# Patient Record
Sex: FEMALE | Race: OTHER | NOT HISPANIC OR LATINO
[De-identification: names, ages, dates, MRNs, and addresses within clinical notes are randomized per-mention and may not be internally consistent; named-entity substitution may affect disease eponyms.]

---

## 2020-06-05 PROBLEM — Z00.00 ENCOUNTER FOR PREVENTIVE HEALTH EXAMINATION: Status: ACTIVE | Noted: 2020-06-05

## 2020-06-11 ENCOUNTER — APPOINTMENT (OUTPATIENT)
Dept: NEPHROLOGY | Facility: CLINIC | Age: 25
End: 2020-06-11
Payer: COMMERCIAL

## 2020-06-11 VITALS
SYSTOLIC BLOOD PRESSURE: 96 MMHG | WEIGHT: 120 LBS | HEIGHT: 65 IN | HEART RATE: 72 BPM | DIASTOLIC BLOOD PRESSURE: 65 MMHG | BODY MASS INDEX: 19.99 KG/M2

## 2020-06-11 DIAGNOSIS — G72.9 MYOPATHY, UNSPECIFIED: ICD-10-CM

## 2020-06-11 PROCEDURE — 99205 OFFICE O/P NEW HI 60 MIN: CPT

## 2020-06-11 NOTE — ASSESSMENT
[FreeTextEntry1] : 25-year-old woman with a two-year history of mild proteinuria associated with an impressively positive CAM, but negative anti-DNA and other autoimmune markers, and normal ESR and CRP.  Her renal function is perfectly normal as is her BP.  Her proteinuria resolved on ACE inhibitor, which was recently stopped.  She clearly has some form of autoimmune diathesis, which falls short of a diagnosis of SLE.  What ever this lupus-like syndrome is, it is very possible that on kidney biopsy she would exhibit essentially class II (mesangial proliferative0 or classI (minimal mesangial), but neither would be treated with anything more than what she has already received namely RAASI .  So I am not currently in favor of performing a percutaneous renal biopsy although this may be needed at some point in the future.  I have ordered a renal ultrasound to be formally read by radiology, and labs to include urine microalbumin, urinalysis, CPK, BRIAN 2 to screen for membranous nephropathy.  If she has significant microalbuminuria again now I would favor putting her back on ramipril or an ARB.  It seems clear that her family harbors autoimmune phenomena and that she does as well.  From a prognostic standpoint, it is very reassuring that her BP is absolutely normal, as is her kidney function and that proteinuria was mild to begin with and responded to ACE inhibition.   Interestingly, she really does not complain of arthralgias as much is myalgias -she does not appear to have any full-blown features of polymyositis or dermatomyositis, but studies have shown renal involvement in about one quarter of these patients, occasionally IgA nephropathy or membranous seen on biopsy.  We will see if her CPK is elevated

## 2020-06-11 NOTE — CONSULT LETTER
[Consult Letter:] : I had the pleasure of evaluating your patient, [unfilled]. [Dear  ___] : Dear  [unfilled], [Sincerely,] : Sincerely, [Please see my note below.] : Please see my note below. [Consult Closing:] : Thank you very much for allowing me to participate in the care of this patient.  If you have any questions, please do not hesitate to contact me. [DrSyl  ___] : Dr. DEL RIO [FreeTextEntry2] : Dr Townsend ([place]) [FreeTextEntry3] : Sincerely, \par \par Johnny Roland MD, FACP

## 2020-06-11 NOTE — HISTORY OF PRESENT ILLNESS
[FreeTextEntry1] : 25-year-old woman with a two-year history of proteinuria, in association with a positive CAM and titers of 1-320 to 1:1080, but negative anti-DNA, normal ESR and CRP, negative Sjogren's negative RNP SSA and SSB, and anti ro.  She was treated with ramipril and a dose of 10 mg and then taper down and off, 1 proteinuria resolved she is now off ramipril for several months and her UA shows sometimes negative and sometimes trace protein.  Her creatinine is 0.73 and her .  She had a single ultrasound of the kidneys done in the urologist office and that was said to be okay.  She exhibits a facial rash at times, and is mildly photosensitive, but denies full Raynaud's.  Her family history is remarkable in that her 25-year-old brother has an CAM of 1 2/3/2020 but no other stigmata of full-blown autoimmune disease her mother has a thyroid problem, which may or may not be Hashimoto's thyroiditis.  Her father has RA.  She has seen a rheumatologist, Dr. Prudencio Abernathy in [place].  Her primary is Dr. Townsend in [place].. She is clinically and biochemically euthyroid.  She complains of achy muscles, not joints, and this appears to been confirmed by a orthopedic consult.  I do not have access to any muscle enzyme results.  She has been placed on hydroxychloroquine but I am not sure of dose.

## 2020-06-11 NOTE — PHYSICAL EXAM
[General Appearance - Alert] : alert [General Appearance - In No Acute Distress] : in no acute distress [Sclera] : the sclera and conjunctiva were normal [PERRL With Normal Accommodation] : pupils were equal in size, round, and reactive to light [Neck Cervical Mass (___cm)] : no neck mass was observed [Neck Appearance] : the appearance of the neck was normal [Outer Ear] : the ears and nose were normal in appearance [Jugular Venous Distention Increased] : there was no jugular-venous distention [Heart Sounds] : normal S1 and S2 [Heart Sounds Gallop] : no gallops [Heart Rate And Rhythm] : heart rate was normal and rhythm regular [Auscultation Breath Sounds / Voice Sounds] : lungs were clear to auscultation bilaterally [Heart Sounds Pericardial Friction Rub] : no pericardial rub [Murmurs] : no murmurs [FreeTextEntry1] : fingertips are cool, but not blanched [Edema] : there was no peripheral edema [Cervical Lymph Nodes Enlarged Posterior Bilaterally] : posterior cervical [Axillary Lymph Nodes Enlarged Bilaterally] : axillary [Cervical Lymph Nodes Enlarged Anterior Bilaterally] : anterior cervical [Supraclavicular Lymph Nodes Enlarged Bilaterally] : supraclavicular [No CVA Tenderness] : no ~M costovertebral angle tenderness [Inguinal Lymph Nodes Enlarged Bilaterally] : inguinal [Femoral Lymph Nodes Enlarged Bilaterally] : femoral [Abnormal Walk] : normal gait [No Spinal Tenderness] : no spinal tenderness [Motor Tone] : muscle strength and tone were normal [Musculoskeletal - Swelling] : no joint swelling seen [Nail Clubbing] : no clubbing  or cyanosis of the fingernails [Skin Turgor] : normal skin turgor [] : no rash [Skin Color & Pigmentation] : normal skin color and pigmentation [No Focal Deficits] : no focal deficits [Deep Tendon Reflexes (DTR)] : deep tendon reflexes were 2+ and symmetric [Sensation] : the sensory exam was normal to light touch and pinprick [Oriented To Time, Place, And Person] : oriented to person, place, and time [Impaired Insight] : insight and judgment were intact [Affect] : the affect was normal

## 2020-07-01 ENCOUNTER — APPOINTMENT (OUTPATIENT)
Dept: NEPHROLOGY | Facility: CLINIC | Age: 25
End: 2020-07-01
Payer: COMMERCIAL

## 2020-07-01 VITALS — BODY MASS INDEX: 19.99 KG/M2 | WEIGHT: 120 LBS | HEIGHT: 65 IN

## 2020-07-01 DIAGNOSIS — Z82.61 FAMILY HISTORY OF ARTHRITIS: ICD-10-CM

## 2020-07-01 DIAGNOSIS — R31.29 OTHER MICROSCOPIC HEMATURIA: ICD-10-CM

## 2020-07-01 DIAGNOSIS — R80.9 PROTEINURIA, UNSPECIFIED: ICD-10-CM

## 2020-07-01 DIAGNOSIS — N18.9 CHRONIC KIDNEY DISEASE, UNSPECIFIED: ICD-10-CM

## 2020-07-01 PROCEDURE — 99442: CPT

## 2020-07-01 NOTE — HISTORY OF PRESENT ILLNESS
[Medical Office: ([facility])___] : at the medical office located in  [Verbal consent obtained from patient] : the patient, [unfilled] [Home] : at home, [unfilled] , at the time of the visit. [FreeTextEntry1] : Discussed with patient : You have chosen to receive care through the use of tele-media.  It enables healthcare providers at different locations to provide safe, effective, and convenient care through the use of technology.  Please note this is a billable encounter.  As with any healthcare service, there are risks associated with the use of tele-media, including  issues.  You understand that I cannot physically examine you and that you may need to come to the office to complete the assessment.   Patient agreed verbally and understands the risks and benefits of tele-media as explained.  All questions regarding tele-media encounters were answered.\par                                                                                                                                                                                         25-year-old woman with a two-year history of proteinuria in association with a high titer CAM, but negative anti-DNA, normal sed rate and CRP, negative Sjogren's negative RNP negative SSA and SSB.  She took ramipril for an extended period and proteinuria has essentially resolved.  Her creatinine was 0.73 with a GFR of 134.  A renal ultrasound is normal.  Hepatitis B and C are negative, BRIAN 2 is normal.  Urinalysis 2 weeks ago showed no proteinuria and a urine microalbumin was 31, at worst that is marginal

## 2020-07-01 NOTE — ASSESSMENT
[FreeTextEntry1] : This 25-year-old woman has an ill-defined autoimmune diathesis associated with a 2-year history of proteinuria, which has essentially resolved after period of therapy with ramipril.  Renal function is normal, and kidneys appear normal on ultrasound.  She recently saw a dermatologist who mentioned a fingernail abnormality.  She will shortly see her rheumatologist, Dr. Prudencio Abernathy and follow-up with her PCP, Dr. Townsend.  Time spent 11 minutes

## 2020-07-01 NOTE — CONSULT LETTER
[Dear  ___] : Dear  [unfilled], [Consult Letter:] : I had the pleasure of evaluating your patient, [unfilled]. [Please see my note below.] : Please see my note below. [Consult Closing:] : Thank you very much for allowing me to participate in the care of this patient.  If you have any questions, please do not hesitate to contact me. [Sincerely,] : Sincerely, [DrSyl  ___] : Dr. DEL RIO [FreeTextEntry2] : Dr Townsend [FreeTextEntry3] : .sig

## 2021-08-16 ENCOUNTER — APPOINTMENT (OUTPATIENT)
Dept: MRI IMAGING | Facility: CLINIC | Age: 26
End: 2021-08-16
Payer: COMMERCIAL

## 2021-08-16 ENCOUNTER — OUTPATIENT (OUTPATIENT)
Dept: OUTPATIENT SERVICES | Facility: HOSPITAL | Age: 26
LOS: 1 days | End: 2021-08-16

## 2021-08-16 DIAGNOSIS — Z00.8 ENCOUNTER FOR OTHER GENERAL EXAMINATION: ICD-10-CM

## 2021-08-16 PROCEDURE — 72197 MRI PELVIS W/O & W/DYE: CPT | Mod: 26

## 2021-08-17 ENCOUNTER — TRANSCRIPTION ENCOUNTER (OUTPATIENT)
Age: 26
End: 2021-08-17

## 2021-08-19 ENCOUNTER — APPOINTMENT (OUTPATIENT)
Dept: MRI IMAGING | Facility: CLINIC | Age: 26
End: 2021-08-19

## 2022-03-29 ENCOUNTER — APPOINTMENT (OUTPATIENT)
Dept: UROGYNECOLOGY | Facility: CLINIC | Age: 27
End: 2022-03-29

## 2022-04-25 ENCOUNTER — RESULT CHARGE (OUTPATIENT)
Age: 27
End: 2022-04-25

## 2022-04-25 ENCOUNTER — APPOINTMENT (OUTPATIENT)
Dept: UROGYNECOLOGY | Facility: CLINIC | Age: 27
End: 2022-04-25
Payer: COMMERCIAL

## 2022-04-25 VITALS
SYSTOLIC BLOOD PRESSURE: 128 MMHG | DIASTOLIC BLOOD PRESSURE: 80 MMHG | WEIGHT: 125 LBS | OXYGEN SATURATION: 100 % | BODY MASS INDEX: 20.83 KG/M2 | HEIGHT: 65 IN

## 2022-04-25 DIAGNOSIS — K59.00 CONSTIPATION, UNSPECIFIED: ICD-10-CM

## 2022-04-25 DIAGNOSIS — Z83.438 FAMILY HISTORY OF OTHER DISORDER OF LIPOPROTEIN METABOLISM AND OTHER LIPIDEMIA: ICD-10-CM

## 2022-04-25 DIAGNOSIS — R39.13 SPLITTING OF URINARY STREAM: ICD-10-CM

## 2022-04-25 DIAGNOSIS — Z80.0 FAMILY HISTORY OF MALIGNANT NEOPLASM OF DIGESTIVE ORGANS: ICD-10-CM

## 2022-04-25 DIAGNOSIS — R30.0 DYSURIA: ICD-10-CM

## 2022-04-25 DIAGNOSIS — Z78.9 OTHER SPECIFIED HEALTH STATUS: ICD-10-CM

## 2022-04-25 DIAGNOSIS — Z82.49 FAMILY HISTORY OF ISCHEMIC HEART DISEASE AND OTHER DISEASES OF THE CIRCULATORY SYSTEM: ICD-10-CM

## 2022-04-25 DIAGNOSIS — K25.9 GASTRIC ULCER, UNSPECIFIED AS ACUTE OR CHRONIC, W/OUT HEMORRHAGE OR PERFORATION: ICD-10-CM

## 2022-04-25 LAB
BILIRUB UR QL STRIP: NEGATIVE
CLARITY UR: CLEAR
COLLECTION METHOD: NORMAL
GLUCOSE UR-MCNC: NEGATIVE
HCG UR QL: 1 EU/DL
HGB UR QL STRIP.AUTO: NEGATIVE
KETONES UR-MCNC: NEGATIVE
LEUKOCYTE ESTERASE UR QL STRIP: NEGATIVE
NITRITE UR QL STRIP: NEGATIVE
PH UR STRIP: 8.5
PROT UR STRIP-MCNC: NEGATIVE
SP GR UR STRIP: 1.01

## 2022-04-25 PROCEDURE — 99204 OFFICE O/P NEW MOD 45 MIN: CPT | Mod: 25

## 2022-04-25 PROCEDURE — 51701 INSERT BLADDER CATHETER: CPT

## 2022-04-25 RX ORDER — HYDROXYCHLOROQUINE SULFATE 400 MG/1
TABLET ORAL
Refills: 0 | Status: DISCONTINUED | COMMUNITY
End: 2022-04-25

## 2022-04-25 RX ORDER — DULOXETINE HYDROCHLORIDE 30 MG/1
CAPSULE, DELAYED RELEASE ORAL
Refills: 0 | Status: ACTIVE | COMMUNITY

## 2022-04-25 RX ORDER — OMEPRAZOLE 10 MG/1
10 CAPSULE, DELAYED RELEASE ORAL
Refills: 0 | Status: ACTIVE | COMMUNITY

## 2022-04-25 NOTE — PHYSICAL EXAM
[Chaperone Present] : A chaperone was present in the examining room during all aspects of the physical examination [No Acute Distress] : in no acute distress [Well developed] : well developed [Well Nourished] : ~L well nourished [Oriented x3] : oriented to person, place, and time [No Edema] : ~T edema was not present [Scar] : a scar was noted [Warm and Dry] : was warm and dry to touch [Normal Gait] : gait was normal [Normal Appearance] : general appearance was normal [Normal] : no abnormalities [Post Void Residual ____ml] : post void residual was [unfilled] ml [Cough] : no cough [Tenderness] : ~T no ~M abdominal tenderness observed [Distended] : not distended [Hernia] : no hernia observed [FreeTextEntry4] : spasm in levator ani R>L [de-identified] : no prolapse

## 2022-04-25 NOTE — PROCEDURE
[Straight Catheterization] : insertion of a straight catheter [Urinary Tract Infection] : a urinary tract infection [None] : there were no complications with the catheter insertion [No Complications] : no complications [Tolerated Well] : the patient tolerated the procedure well [Post procedure instructions and information given] : Post procedure instructions and information were given and reviewed with patient.

## 2022-04-25 NOTE — DISCUSSION/SUMMARY
[FreeTextEntry1] : Ms. DURHAM is 26yo with pelvic floor muscle spasm, vaginal burning, sensation of incomplete emptying. We discussed my treatment algorithm for pelvic floor muscle spasm.  She was given Valium but only took it for a week.  I suggested she needed to use it for a longer period of time and in conjunction with good pelvic floor PT to determine if it is effective.  I sent a prescription to a compounding pharmacy for 5 mg #30.  We discussed physical therapy and what is invovled with good pelvic floor PT.  The therapist she went to for 1 visit wanted to do electrical stimulation and I told her I thought that was a bad idea.  I [facility] to [facility].  We discussed seeing Sofy Nanci Valdes to evaluate her for vulvodynia and to determine if there are any topical ointments that could help with some of her symptoms.  I asked her to come back and see me in 2 to 3 months.  I was able to answer all of her questions.

## 2022-04-25 NOTE — LETTER BODY
[Dear  ___] : Dear  [unfilled], [I had the pleasure of evaluating your patient, [unfilled]. Thank you for referring Ms. [unfilled] for consultation for ___] : I had the pleasure of evaluating your patient, [unfilled]. Thank you for referring Ms. [unfilled] for consultation for [unfilled]. [DrSyl  ___] : Dr. DEL RIO

## 2022-04-25 NOTE — HISTORY OF PRESENT ILLNESS
[FreeTextEntry1] : 26 yo female with complaints of vaginal and pelvic burning. Has been ongoing for some time now. Has burning before and after urination as well just sitting. She also feels her stream is slow and she has difficulty emptying her bladder fully. She has pain with intercourse. She feels burning and tightness. She is abstaining because of the discomfort. She was diagnose with pelvic floor dysfunction and went to 1 session with a PT who did biofeedback. She was also given Valium suppositories but that did not help at all with the burning. Took it for 5 days. No leaking of urine. Taking cymbalta and it has been making her constipated lately. She has a very long history of issues pelvic muscle tightness but the burning really started in august.

## 2022-04-25 NOTE — OB HISTORY
[Total Preg ___] : : [unfilled] [Definite ___ (Date)] : the last menstrual period was [unfilled] [Normal Amount/Duration] : was of a normal amount and duration [Regular Cycle Intervals] : periods have been regular [Last Pap Smear ___] : date of last pap smear was on [unfilled] [Abstaining d/t Present Problem] : abstaining due to present problem [Abnormal Pap Smear] : normal pap smear [Taking Estrogens] : is not taking estrogen replacement [Sexually Active] : is not sexually active

## 2022-04-29 ENCOUNTER — TRANSCRIPTION ENCOUNTER (OUTPATIENT)
Age: 27
End: 2022-04-29

## 2022-07-28 ENCOUNTER — APPOINTMENT (OUTPATIENT)
Dept: UROGYNECOLOGY | Facility: CLINIC | Age: 27
End: 2022-07-28

## 2022-09-20 ENCOUNTER — APPOINTMENT (OUTPATIENT)
Dept: UROGYNECOLOGY | Facility: CLINIC | Age: 27
End: 2022-09-20

## 2022-09-20 DIAGNOSIS — N89.8 OTHER SPECIFIED NONINFLAMMATORY DISORDERS OF VAGINA: ICD-10-CM

## 2022-09-20 PROCEDURE — 99215 OFFICE O/P EST HI 40 MIN: CPT

## 2022-09-20 PROCEDURE — 87210 SMEAR WET MOUNT SALINE/INK: CPT | Mod: QW

## 2022-09-20 PROCEDURE — 83986 ASSAY PH BODY FLUID NOS: CPT | Mod: QW

## 2022-09-24 LAB
A VAGINAE DNA VAG QL NAA+PROBE: NORMAL
BVAB2 DNA VAG QL NAA+PROBE: NORMAL
C KRUSEI DNA VAG QL NAA+PROBE: NEGATIVE
MEGA1 DNA VAG QL NAA+PROBE: NORMAL
T VAGINALIS RRNA SPEC QL NAA+PROBE: NEGATIVE

## 2022-10-20 ENCOUNTER — APPOINTMENT (OUTPATIENT)
Dept: UROGYNECOLOGY | Facility: CLINIC | Age: 27
End: 2022-10-20

## 2022-10-20 VITALS
SYSTOLIC BLOOD PRESSURE: 114 MMHG | DIASTOLIC BLOOD PRESSURE: 76 MMHG | HEIGHT: 65 IN | WEIGHT: 125 LBS | BODY MASS INDEX: 20.83 KG/M2

## 2022-10-20 PROCEDURE — 51798 US URINE CAPACITY MEASURE: CPT

## 2022-10-20 PROCEDURE — 99213 OFFICE O/P EST LOW 20 MIN: CPT | Mod: 25

## 2022-10-24 NOTE — HISTORY OF PRESENT ILLNESS
[FreeTextEntry1] : Has been doing PT. Also taking nortriptyline, estradiol/gabapentin cream, baclofen. Feels nothing has really improved. Was thinking about trigger point injection.

## 2022-10-24 NOTE — DISCUSSION/SUMMARY
[FreeTextEntry1] : Charlene has pelvic floor dysfunction, burning, pelvic pain. She is currently on a treatment regiment with RADHIKA Christine. She does not feel much improvement. She is also getting PT. We discussed what would  be involved in trigger point injections with BTX. Will consider this after enough time has elapsed on current regiment.

## 2022-10-24 NOTE — PHYSICAL EXAM
[Chaperone Present] : A chaperone was present in the examining room during all aspects of the physical examination [No Acute Distress] : in no acute distress [Oriented x3] : oriented to person, place, and time [Normal Mood/Affect] : mood and affect are normal [Normal Appearance] : general appearance was normal [Tenderness] : tenderness [Normal] : no abnormalities [Cough] : no cough [FreeTextEntry4] : spasm in levator ani and OI L>R tenderness

## 2022-10-31 ENCOUNTER — TRANSCRIPTION ENCOUNTER (OUTPATIENT)
Age: 27
End: 2022-10-31

## 2022-11-04 ENCOUNTER — NON-APPOINTMENT (OUTPATIENT)
Age: 27
End: 2022-11-04

## 2022-11-07 ENCOUNTER — NON-APPOINTMENT (OUTPATIENT)
Age: 27
End: 2022-11-07

## 2022-11-10 ENCOUNTER — APPOINTMENT (OUTPATIENT)
Dept: UROGYNECOLOGY | Facility: CLINIC | Age: 27
End: 2022-11-10

## 2022-11-10 VITALS
WEIGHT: 125 LBS | DIASTOLIC BLOOD PRESSURE: 78 MMHG | SYSTOLIC BLOOD PRESSURE: 120 MMHG | HEIGHT: 66 IN | BODY MASS INDEX: 20.09 KG/M2

## 2022-11-10 DIAGNOSIS — M79.18 MYALGIA, OTHER SITE: ICD-10-CM

## 2022-11-10 PROCEDURE — 99213 OFFICE O/P EST LOW 20 MIN: CPT

## 2022-11-10 NOTE — PHYSICAL EXAM
[Normal] : normal external genitalia [Normal Appearance] : general appearance was normal [FreeTextEntry4] : spasm in perineal body levator ani and OI on Right>>> left

## 2022-11-10 NOTE — DISCUSSION/SUMMARY
[FreeTextEntry1] : We discussed BTX and pudendal nerve block. We discussed risk of exacerbation of pain, it not helping with the pain. I encouraged her to continue with everything that Sofy EDGAR is doing as that will take months to determine its true effectiveness. Plan to inject 5 mls into perineal body, bulbocavernosus and 3mls in right OI and Levator anad 2mls into left levator.

## 2022-11-17 DIAGNOSIS — Z01.818 ENCOUNTER FOR OTHER PREPROCEDURAL EXAMINATION: ICD-10-CM

## 2022-11-18 ENCOUNTER — OUTPATIENT (OUTPATIENT)
Dept: OUTPATIENT SERVICES | Facility: HOSPITAL | Age: 27
LOS: 1 days | End: 2022-11-18
Payer: COMMERCIAL

## 2022-11-18 VITALS
RESPIRATION RATE: 16 BRPM | OXYGEN SATURATION: 99 % | WEIGHT: 125.44 LBS | HEIGHT: 66 IN | SYSTOLIC BLOOD PRESSURE: 92 MMHG | HEART RATE: 83 BPM | TEMPERATURE: 98 F | DIASTOLIC BLOOD PRESSURE: 60 MMHG

## 2022-11-18 DIAGNOSIS — Z87.42 PERSONAL HISTORY OF OTHER DISEASES OF THE FEMALE GENITAL TRACT: Chronic | ICD-10-CM

## 2022-11-18 DIAGNOSIS — Z01.818 ENCOUNTER FOR OTHER PREPROCEDURAL EXAMINATION: ICD-10-CM

## 2022-11-18 DIAGNOSIS — Z13.89 ENCOUNTER FOR SCREENING FOR OTHER DISORDER: ICD-10-CM

## 2022-11-18 DIAGNOSIS — R10.2 PELVIC AND PERINEAL PAIN: ICD-10-CM

## 2022-11-18 DIAGNOSIS — Z29.9 ENCOUNTER FOR PROPHYLACTIC MEASURES, UNSPECIFIED: ICD-10-CM

## 2022-11-18 LAB
ANION GAP SERPL CALC-SCNC: 15 MMOL/L — SIGNIFICANT CHANGE UP (ref 5–17)
APTT BLD: 30.2 SEC — SIGNIFICANT CHANGE UP (ref 27.5–35.5)
BASOPHILS # BLD AUTO: 0.1 K/UL — SIGNIFICANT CHANGE UP (ref 0–0.2)
BASOPHILS NFR BLD AUTO: 1.2 % — SIGNIFICANT CHANGE UP (ref 0–2)
BLD GP AB SCN SERPL QL: SIGNIFICANT CHANGE UP
BUN SERPL-MCNC: 13.8 MG/DL — SIGNIFICANT CHANGE UP (ref 8–20)
CALCIUM SERPL-MCNC: 9.7 MG/DL — SIGNIFICANT CHANGE UP (ref 8.4–10.5)
CHLORIDE SERPL-SCNC: 104 MMOL/L — SIGNIFICANT CHANGE UP (ref 96–108)
CO2 SERPL-SCNC: 22 MMOL/L — SIGNIFICANT CHANGE UP (ref 22–29)
CREAT SERPL-MCNC: 0.6 MG/DL — SIGNIFICANT CHANGE UP (ref 0.5–1.3)
EGFR: 128 ML/MIN/1.73M2 — SIGNIFICANT CHANGE UP
EOSINOPHIL # BLD AUTO: 0.11 K/UL — SIGNIFICANT CHANGE UP (ref 0–0.5)
EOSINOPHIL NFR BLD AUTO: 1.3 % — SIGNIFICANT CHANGE UP (ref 0–6)
GLUCOSE SERPL-MCNC: 94 MG/DL — SIGNIFICANT CHANGE UP (ref 70–99)
HCG SERPL-ACNC: <4 MIU/ML — SIGNIFICANT CHANGE UP
HCT VFR BLD CALC: 43.1 % — SIGNIFICANT CHANGE UP (ref 34.5–45)
HGB BLD-MCNC: 13.9 G/DL — SIGNIFICANT CHANGE UP (ref 11.5–15.5)
IMM GRANULOCYTES NFR BLD AUTO: 0.2 % — SIGNIFICANT CHANGE UP (ref 0–0.9)
INR BLD: 1.07 RATIO — SIGNIFICANT CHANGE UP (ref 0.88–1.16)
LYMPHOCYTES # BLD AUTO: 2.56 K/UL — SIGNIFICANT CHANGE UP (ref 1–3.3)
LYMPHOCYTES # BLD AUTO: 31.3 % — SIGNIFICANT CHANGE UP (ref 13–44)
MCHC RBC-ENTMCNC: 25.5 PG — LOW (ref 27–34)
MCHC RBC-ENTMCNC: 32.3 GM/DL — SIGNIFICANT CHANGE UP (ref 32–36)
MCV RBC AUTO: 79.1 FL — LOW (ref 80–100)
MONOCYTES # BLD AUTO: 0.52 K/UL — SIGNIFICANT CHANGE UP (ref 0–0.9)
MONOCYTES NFR BLD AUTO: 6.4 % — SIGNIFICANT CHANGE UP (ref 2–14)
NEUTROPHILS # BLD AUTO: 4.86 K/UL — SIGNIFICANT CHANGE UP (ref 1.8–7.4)
NEUTROPHILS NFR BLD AUTO: 59.6 % — SIGNIFICANT CHANGE UP (ref 43–77)
PLATELET # BLD AUTO: 265 K/UL — SIGNIFICANT CHANGE UP (ref 150–400)
POTASSIUM SERPL-MCNC: 4 MMOL/L — SIGNIFICANT CHANGE UP (ref 3.5–5.3)
POTASSIUM SERPL-SCNC: 4 MMOL/L — SIGNIFICANT CHANGE UP (ref 3.5–5.3)
PROTHROM AB SERPL-ACNC: 12.4 SEC — SIGNIFICANT CHANGE UP (ref 10.5–13.4)
RBC # BLD: 5.45 M/UL — HIGH (ref 3.8–5.2)
RBC # FLD: 18.6 % — HIGH (ref 10.3–14.5)
SODIUM SERPL-SCNC: 141 MMOL/L — SIGNIFICANT CHANGE UP (ref 135–145)
WBC # BLD: 8.17 K/UL — SIGNIFICANT CHANGE UP (ref 3.8–10.5)
WBC # FLD AUTO: 8.17 K/UL — SIGNIFICANT CHANGE UP (ref 3.8–10.5)

## 2022-11-18 PROCEDURE — 93005 ELECTROCARDIOGRAM TRACING: CPT

## 2022-11-18 PROCEDURE — 93010 ELECTROCARDIOGRAM REPORT: CPT

## 2022-11-18 PROCEDURE — G0463: CPT

## 2022-11-18 NOTE — H&P PST ADULT - PROBLEM SELECTOR PLAN 2
Caprini -2 mild risk   Surgical team please assess/recommend mechanical/pharmacological measures for VTE prophylaxis

## 2022-11-18 NOTE — H&P PST ADULT - PROBLEM SELECTOR PLAN 1
28 y/o female  with PMH of anemia, arrhythmia now with pelvic/perineal pain scheduled for pudendal nerve block and trigger point injection with botox 2022.    -Patient educated on ERP protocol (written/verbal)- verbalized understanding  -Educated on NSAIDS, multivitamins and herbals that increase the risk of bleeding and need to be stopped 5 days before procedure  -Educated on infection prevention  -Covid testing 3-5 days prior to surgery   -Tylenol can be taken 5 days before surgery if needed for pain  -Verbalized understanding of all instructions

## 2022-11-18 NOTE — H&P PST ADULT - HISTORY OF PRESENT ILLNESS
with PMH of HLD, hashimoto presents to PST today. Reports hx of pelvic and perineal pain for about 6 years.  Pt. reports her initial symptoms was inability to use the bathroom. F/u GYN.  Rectocele and colporrhaphy completed 2016. Pt. reports she started having pelvic/perineal pain since the surgery. Reports pain her buttocks and in her groin as well. She is developing hip flexor pain L>R. Completed multiple nerve blocks with the last month 10/2022 with short term relief. Attends pelvic floor PT once a week with no relief yet. Reports pulsating feeling in mid abdomen. Pt. uses miralax every night and has had bowel movements but states that she never feels that she fully emptied her bowel. Denies any bowel incontinence. Scheduled for bilateral pudendal nerve block and trigger point injection with botox 11/30/2022.   26 y/o female  with PMH of anemia present to PST today. Reports hx of pelvic and perineal pain for about 10 years. Reports burning in the vestibule when she urinates and dyspareunia. Started pelvic PT for 3 months without any relief. Reports constipation occasionally. Denies any UTI symptoms. Last pap - Normal.  Scheduled for pudendal nerve block and trigger point injection with botox 2022.   26 y/o female  with PMH of anemia, arrhythmia present to PST today. Reports hx of pelvic and perineal pain for about 10 years. Reports vestibulodynia when she urinates and dyspareunia as well. Started pelvic PT for 3 months without any relief. Reports constipation occasionally. Denies any UTI symptoms. Last pap - Normal.  Scheduled for pudendal nerve block and trigger point injection with botox 2022.

## 2022-11-18 NOTE — H&P PST ADULT - NSICDXPASTMEDICALHX_GEN_ALL_CORE_FT
PAST MEDICAL HISTORY:  Skipped heart beats      PAST MEDICAL HISTORY:  Anemia     Skipped heart beats

## 2022-11-18 NOTE — H&P PST ADULT - ASSESSMENT
28 y/o female  with PMH of anemia, arrhythmia now with pelvic/perineal pain scheduled for pudendal nerve block and trigger point injection with botox 2022.    -Patient educated on ERP protocol (written/verbal)- verbalized understanding  -Educated on NSAIDS, multivitamins and herbals that increase the risk of bleeding and need to be stopped 5 days before procedure  -Educated on infection prevention  -Covid testing 3-5 days prior to surgery   -Tylenol can be taken 5 days before surgery if needed for pain  -Verbalized understanding of all instructions    OPIOID RISK TOOL    JOSELNY EACH BOX THAT APPLIES AND ADD TOTALS AT THE END    FAMILY HISTORY OF SUBSTANCE ABUSE                 FEMALE         MALE                                                Alcohol                             [  ]1 pt          [  ]3pts                                               Illegal Durgs                     [  ]2 pts        [  ]3pts                                               Rx Drugs                           [  ]4 pts        [  ]4 pts    PERSONAL HISTORY OF SUBSTANCE ABUSE                                                                                          Alcohol                             [  ]3 pts       [  ]3 pts                                               Illegal Drugs                     [  ]4 pts        [  ]4 pts                                               Rx Drugs                           [  ]5 pts        [  ]5 pts    AGE BETWEEN 16-45 YEARS                                      [ x ]1 pt         [  ]1 pt    HISTORY OF PREADOLESCENT   SEXUAL ABUSE                                                             [  ]3 pts        [  ]0pts    PSYCHOLOGICAL DISEASE                     ADD, OCD, Bipolar, Schizophrenia        [  ]2 pts         [  ]2 pts                      Depression                                               [  ]1 pt           [  ]1 pt           SCORING TOTAL   (add numbers and type here)              (1)                                     A score of 3 or lower indicated LOW risk for future opioid abuse  A score of 4 to 7 indicated moderate risk for future opioid abuse  A score of 8 or higher indicates a high risk for opioid abuse    CAPRINI SCORE [CLOT]    AGE RELATED RISK FACTORS                                                       MOBILITY RELATED FACTORS  [ ] Age 41-60 years                                            (1 Point)                  [ ] Bed rest                                                        (1 Point)  [ ] Age: 61-74 years                                           (2 Points)                 [ ] Plaster cast                                                   (2 Points)  [ ] Age= 75 years                                              (3 Points)                 [ ] Bed bound for more than 72 hours                 (2 Points)    DISEASE RELATED RISK FACTORS                                               GENDER SPECIFIC FACTORS  [ ] Edema in the lower extremities                       (1 Point)                  [ ] Pregnancy                                                     (1 Point)  [ ] Varicose veins                                               (1 Point)                  [ ] Post-partum < 6 weeks                                   (1 Point)             [ ] BMI > 25 Kg/m2                                            (1 Point)                  [ ] Hormonal therapy  or oral contraception          (1 Point)                 [ ] Sepsis (in the previous month)                        (1 Point)                  [ ] History of pregnancy complications                 (1 point)  [ ] Pneumonia or serious lung disease                                               [ ] Unexplained or recurrent                     (1 Point)           (in the previous month)                               (1 Point)  [ ] Abnormal pulmonary function test                     (1 Point)                 SURGERY RELATED RISK FACTORS  [ ] Acute myocardial infarction                              (1 Point)                 [ ]  Section                                             (1 Point)  [ ] Congestive heart failure (in the previous month)  (1 Point)               [ ] Minor surgery                                                  (1 Point)   [ ] Inflammatory bowel disease                             (1 Point)                 [ ] Arthroscopic surgery                                        (2 Points)  [ ] Central venous access                                      (2 Points)                [x ] General surgery lasting more than 45 minutes   (2 Points)       [ ] Stroke (in the previous month)                          (5 Points)               [ ] Elective arthroplasty                                         (5 Points)                                                                                                                                               HEMATOLOGY RELATED FACTORS                                                 TRAUMA RELATED RISK FACTORS  [ ] Prior episodes of VTE                                     (3 Points)                [ ] Fracture of the hip, pelvis, or leg                       (5 Points)  [ ] Positive family history for VTE                         (3 Points)                 [ ] Acute spinal cord injury (in the previous month)  (5 Points)  [ ] Prothrombin 68736 A                                     (3 Points)                 [ ] Paralysis  (less than 1 month)                             (5 Points)  [ ] Factor V Leiden                                             (3 Points)                  [ ] Multiple Trauma within 1 month                        (5 Points)  [ ] Lupus anticoagulants                                     (3 Points)                                                           [ ] Anticardiolipin antibodies                               (3 Points)                                                       [ ] High homocysteine in the blood                      (3 Points)                                             [ ] Other congenital or acquired thrombophilia      (3 Points)                                                [ ] Heparin induced thrombocytopenia                  (3 Points)                                          Total Score [     2     ]    Caprini Score 0 - 2:  Low Risk, No VTE Prophylaxis required for most patients, encourage ambulation  Caprini Score 3 - 6:  At Risk, pharmacologic VTE prophylaxis is indicated for most patients (in the absence of a contraindication)  Caprini Score Greater than or = 7:  High Risk, pharmacologic VTE prophylaxis is indicated for most patients (in the absence of a contraindication)

## 2022-11-24 LAB
APPEARANCE: CLEAR
BILIRUBIN URINE: NEGATIVE
BLOOD URINE: NEGATIVE
COLOR: NORMAL
GLUCOSE QUALITATIVE U: NEGATIVE
KETONES URINE: ABNORMAL
LEUKOCYTE ESTERASE URINE: NEGATIVE
NITRITE URINE: NEGATIVE
PH URINE: 6.5
PROTEIN URINE: NEGATIVE
SPECIFIC GRAVITY URINE: 1.02
UROBILINOGEN URINE: NORMAL

## 2022-11-25 LAB — BACTERIA UR CULT: NORMAL

## 2022-11-29 ENCOUNTER — TRANSCRIPTION ENCOUNTER (OUTPATIENT)
Age: 27
End: 2022-11-29

## 2022-11-29 LAB — SARS-COV-2 N GENE NPH QL NAA+PROBE: NOT DETECTED

## 2022-11-29 NOTE — ASU PATIENT PROFILE, ADULT - WAS YOUR LAST COVID-19 VACCINE GREATER THAN OR EQUAL TO TWO MONTHS AGO?
11/08/19                            Wil Jarrett  [address] [facility] [address]    To Whom It May Concern:    This is to certify Wil Jarrett was evaluated with [facility] [address] on 11/08/19 and can return to regular work on 11/11/19 if no fever.                [facility] [place] [address] [place] [address] [address] [facility] [address]  Phone: [phone]  Fax: [phone]                    
Yes

## 2022-11-30 ENCOUNTER — TRANSCRIPTION ENCOUNTER (OUTPATIENT)
Age: 27
End: 2022-11-30

## 2022-11-30 ENCOUNTER — OUTPATIENT (OUTPATIENT)
Dept: OUTPATIENT SERVICES | Facility: HOSPITAL | Age: 27
LOS: 1 days | End: 2022-11-30
Payer: COMMERCIAL

## 2022-11-30 ENCOUNTER — APPOINTMENT (OUTPATIENT)
Dept: UROGYNECOLOGY | Facility: HOSPITAL | Age: 27
End: 2022-11-30

## 2022-11-30 VITALS
RESPIRATION RATE: 18 BRPM | HEART RATE: 77 BPM | OXYGEN SATURATION: 100 % | TEMPERATURE: 98 F | SYSTOLIC BLOOD PRESSURE: 123 MMHG | DIASTOLIC BLOOD PRESSURE: 79 MMHG

## 2022-11-30 VITALS
DIASTOLIC BLOOD PRESSURE: 81 MMHG | HEIGHT: 66 IN | OXYGEN SATURATION: 100 % | SYSTOLIC BLOOD PRESSURE: 141 MMHG | RESPIRATION RATE: 15 BRPM | WEIGHT: 125.44 LBS | TEMPERATURE: 98 F | HEART RATE: 98 BPM

## 2022-11-30 DIAGNOSIS — M79.10 MYALGIA, UNSPECIFIED SITE: ICD-10-CM

## 2022-11-30 DIAGNOSIS — R10.2 PELVIC AND PERINEAL PAIN: ICD-10-CM

## 2022-11-30 DIAGNOSIS — Z87.42 PERSONAL HISTORY OF OTHER DISEASES OF THE FEMALE GENITAL TRACT: Chronic | ICD-10-CM

## 2022-11-30 PROCEDURE — 20553 NJX 1/MLT TRIGGER POINTS 3/>: CPT

## 2022-11-30 PROCEDURE — 20553 NJX 1/MLT TRIGGER POINTS 3/>: CPT | Mod: XU

## 2022-11-30 PROCEDURE — 64430 NJX AA&/STRD PUDENDAL NERVE: CPT | Mod: 50,XU

## 2022-11-30 PROCEDURE — 96372 THER/PROPH/DIAG INJ SC/IM: CPT | Mod: XS

## 2022-11-30 RX ORDER — CELECOXIB 200 MG/1
400 CAPSULE ORAL ONCE
Refills: 0 | Status: COMPLETED | OUTPATIENT
Start: 2022-11-30 | End: 2022-11-30

## 2022-11-30 RX ORDER — CEFAZOLIN SODIUM 1 G
2000 VIAL (EA) INJECTION ONCE
Refills: 0 | Status: COMPLETED | OUTPATIENT
Start: 2022-11-30 | End: 2022-11-30

## 2022-11-30 RX ORDER — ONDANSETRON 8 MG/1
4 TABLET, FILM COATED ORAL ONCE
Refills: 0 | Status: DISCONTINUED | OUTPATIENT
Start: 2022-11-30 | End: 2022-11-30

## 2022-11-30 RX ORDER — FENTANYL CITRATE 50 UG/ML
25 INJECTION INTRAVENOUS
Refills: 0 | Status: DISCONTINUED | OUTPATIENT
Start: 2022-11-30 | End: 2022-11-30

## 2022-11-30 RX ORDER — IBUPROFEN 200 MG
1 TABLET ORAL
Qty: 28 | Refills: 0
Start: 2022-11-30 | End: 2022-12-06

## 2022-11-30 RX ORDER — ACETAMINOPHEN 500 MG
975 TABLET ORAL ONCE
Refills: 0 | Status: COMPLETED | OUTPATIENT
Start: 2022-11-30 | End: 2022-11-30

## 2022-11-30 RX ORDER — SODIUM CHLORIDE 9 MG/ML
3 INJECTION INTRAMUSCULAR; INTRAVENOUS; SUBCUTANEOUS ONCE
Refills: 0 | Status: DISCONTINUED | OUTPATIENT
Start: 2022-11-30 | End: 2022-11-30

## 2022-11-30 NOTE — BRIEF OPERATIVE NOTE - NSICDXBRIEFPROCEDURE_GEN_ALL_CORE_FT
PROCEDURES:  Trigger point injection 30-Nov-2022 13:19:59  Marlena Das  Injection of Botox into face 30-Nov-2022 13:20:51  Marlena Das  Pudendal nerve block 30-Nov-2022 13:22:23  Marlena Das

## 2022-11-30 NOTE — ASU DISCHARGE PLAN (ADULT/PEDIATRIC) - CARE PROVIDER_API CALL
Josh Rea)  Obstetrics and Gynecology; Urogynecology  [address] [place] [address], [facility] [address]  [place] [address]  Phone: [phone]  Fax: [phone]  Follow Up Time: 2 weeks

## 2022-11-30 NOTE — ASU DISCHARGE PLAN (ADULT/PEDIATRIC) - NS MD DC FALL RISK RISK
For information on Fall & Injury Prevention, visit: [email or web address] OR  [email or web address] OR  https://www.cdc.gov/steadi/patient.html

## 2022-12-06 ENCOUNTER — APPOINTMENT (OUTPATIENT)
Dept: UROLOGY | Facility: CLINIC | Age: 27
End: 2022-12-06

## 2022-12-07 ENCOUNTER — APPOINTMENT (OUTPATIENT)
Dept: OBGYN | Facility: CLINIC | Age: 27
End: 2022-12-07

## 2022-12-13 ENCOUNTER — NON-APPOINTMENT (OUTPATIENT)
Age: 27
End: 2022-12-13

## 2022-12-13 ENCOUNTER — APPOINTMENT (OUTPATIENT)
Dept: UROGYNECOLOGY | Facility: CLINIC | Age: 27
End: 2022-12-13

## 2022-12-22 ENCOUNTER — APPOINTMENT (OUTPATIENT)
Dept: UROGYNECOLOGY | Facility: CLINIC | Age: 27
End: 2022-12-22

## 2023-05-30 ENCOUNTER — OUTPATIENT (OUTPATIENT)
Dept: OUTPATIENT SERVICES | Facility: HOSPITAL | Age: 28
LOS: 1 days | End: 2023-05-30
Payer: COMMERCIAL

## 2023-05-30 VITALS
HEART RATE: 72 BPM | HEIGHT: 66 IN | SYSTOLIC BLOOD PRESSURE: 90 MMHG | RESPIRATION RATE: 16 BRPM | TEMPERATURE: 97 F | DIASTOLIC BLOOD PRESSURE: 60 MMHG | OXYGEN SATURATION: 99 % | WEIGHT: 131.62 LBS

## 2023-05-30 DIAGNOSIS — R10.2 PELVIC AND PERINEAL PAIN: ICD-10-CM

## 2023-05-30 DIAGNOSIS — Z87.42 PERSONAL HISTORY OF OTHER DISEASES OF THE FEMALE GENITAL TRACT: Chronic | ICD-10-CM

## 2023-05-30 DIAGNOSIS — Z01.818 ENCOUNTER FOR OTHER PREPROCEDURAL EXAMINATION: ICD-10-CM

## 2023-05-30 DIAGNOSIS — Z29.9 ENCOUNTER FOR PROPHYLACTIC MEASURES, UNSPECIFIED: ICD-10-CM

## 2023-05-30 LAB
A1C WITH ESTIMATED AVERAGE GLUCOSE RESULT: 5 % — SIGNIFICANT CHANGE UP (ref 4–5.6)
ANION GAP SERPL CALC-SCNC: 10 MMOL/L — SIGNIFICANT CHANGE UP (ref 5–17)
APPEARANCE UR: ABNORMAL
BACTERIA # UR AUTO: ABNORMAL
BILIRUB UR-MCNC: NEGATIVE — SIGNIFICANT CHANGE UP
BUN SERPL-MCNC: 12.5 MG/DL — SIGNIFICANT CHANGE UP (ref 8–20)
CALCIUM SERPL-MCNC: 9.7 MG/DL — SIGNIFICANT CHANGE UP (ref 8.4–10.5)
CHLORIDE SERPL-SCNC: 106 MMOL/L — SIGNIFICANT CHANGE UP (ref 96–108)
CO2 SERPL-SCNC: 25 MMOL/L — SIGNIFICANT CHANGE UP (ref 22–29)
COLOR SPEC: YELLOW — SIGNIFICANT CHANGE UP
CREAT SERPL-MCNC: 0.63 MG/DL — SIGNIFICANT CHANGE UP (ref 0.5–1.3)
DIFF PNL FLD: ABNORMAL
EGFR: 125 ML/MIN/1.73M2 — SIGNIFICANT CHANGE UP
EPI CELLS # UR: SIGNIFICANT CHANGE UP
ESTIMATED AVERAGE GLUCOSE: 97 MG/DL — SIGNIFICANT CHANGE UP (ref 68–114)
GLUCOSE SERPL-MCNC: 119 MG/DL — HIGH (ref 70–99)
GLUCOSE UR QL: NEGATIVE MG/DL — SIGNIFICANT CHANGE UP
HCT VFR BLD CALC: 41.5 % — SIGNIFICANT CHANGE UP (ref 34.5–45)
HGB BLD-MCNC: 13.1 G/DL — SIGNIFICANT CHANGE UP (ref 11.5–15.5)
KETONES UR-MCNC: NEGATIVE — SIGNIFICANT CHANGE UP
LEUKOCYTE ESTERASE UR-ACNC: NEGATIVE — SIGNIFICANT CHANGE UP
MCHC RBC-ENTMCNC: 27.8 PG — SIGNIFICANT CHANGE UP (ref 27–34)
MCHC RBC-ENTMCNC: 31.6 GM/DL — LOW (ref 32–36)
MCV RBC AUTO: 88.1 FL — SIGNIFICANT CHANGE UP (ref 80–100)
NITRITE UR-MCNC: NEGATIVE — SIGNIFICANT CHANGE UP
PH UR: 6 — SIGNIFICANT CHANGE UP (ref 5–8)
PLATELET # BLD AUTO: 209 K/UL — SIGNIFICANT CHANGE UP (ref 150–400)
POTASSIUM SERPL-MCNC: 4.4 MMOL/L — SIGNIFICANT CHANGE UP (ref 3.5–5.3)
POTASSIUM SERPL-SCNC: 4.4 MMOL/L — SIGNIFICANT CHANGE UP (ref 3.5–5.3)
PROT UR-MCNC: 15
RBC # BLD: 4.71 M/UL — SIGNIFICANT CHANGE UP (ref 3.8–5.2)
RBC # FLD: 13.6 % — SIGNIFICANT CHANGE UP (ref 10.3–14.5)
RBC CASTS # UR COMP ASSIST: ABNORMAL /HPF (ref 0–4)
SODIUM SERPL-SCNC: 141 MMOL/L — SIGNIFICANT CHANGE UP (ref 135–145)
SP GR SPEC: 1.02 — SIGNIFICANT CHANGE UP (ref 1.01–1.02)
UROBILINOGEN FLD QL: NEGATIVE MG/DL — SIGNIFICANT CHANGE UP
WBC # BLD: 5.99 K/UL — SIGNIFICANT CHANGE UP (ref 3.8–10.5)
WBC # FLD AUTO: 5.99 K/UL — SIGNIFICANT CHANGE UP (ref 3.8–10.5)
WBC UR QL: SIGNIFICANT CHANGE UP /HPF (ref 0–5)

## 2023-05-30 PROCEDURE — G0463: CPT

## 2023-05-30 RX ORDER — NORTRIPTYLINE HYDROCHLORIDE 10 MG/1
30 CAPSULE ORAL
Qty: 0 | Refills: 0 | DISCHARGE

## 2023-05-30 RX ORDER — IRON POLYSACCHARIDE COMPLEX 150 MG
0 CAPSULE ORAL
Qty: 0 | Refills: 0 | DISCHARGE

## 2023-05-30 NOTE — H&P PST ADULT - ASSESSMENT
29 y/o female  with no PMH present to PST today, Reports hx of pelvic and perineal pain/tightness for more 10 years. 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 She had pelvic PT for few months without any relief. Denies any UTI symptoms. Last pap - Normal. she had a pudendal nerve block and trigger point injection with Botox 2022. Now she is scheduled for a Trigger point Injection with 300 units of Botox, Pudendal nerve Block by Dr. Rea on 23.    medications reviewed, instructions given on what medications to take and what not to take. No meds to be taken in the AM of surgery. Stop MVI, Co Q 10, Omega 3 DHEA 3-5 days prior to surgery. All other meds can be continued till the night before surgery. ERP teaching given and the pt verbalized understanding. pt is not taking ASA/Plavix/Anticoagulation medication at this time. Asked the pt not to take any NSAID's 5-7 days before surgery and told the pt Tylenol is okay to take for pain, pt verbalized understanding.   CAPRINI VTE 2.0 SCORE [CLOT updated 2019]    AGE RELATED RISK FACTORS                                                       MOBILITY RELATED FACTORS  [ ] Age 41-60 years                                            (1 Point)                    [ ] Bed rest                                                        (1 Point)  [ ] Age: 61-74 years                                           (2 Points)                  [ ] Plaster cast                                                   (2 Points)  [ ] Age= 75 years                                              (3 Points)                    [ ] Bed bound for more than 72 hours                 (2 Points)    DISEASE RELATED RISK FACTORS                                               GENDER SPECIFIC FACTORS  [ ] Edema in the lower extremities                       (1 Point)              [ ] Pregnancy                                                     (1 Point)  [ ] Varicose veins                                               (1 Point)                     [ ] Post-partum < 6 weeks                                   (1 Point)             [ ] BMI > 25 Kg/m2                                            (1 Point)                     [ ] Hormonal therapy  or oral contraception          (1 Point)                 [ ] Sepsis (in the previous month)                        (1 Point)               [ ] History of pregnancy complications                 (1 point)  [ ] Pneumonia or serious lung disease                                               [ ] Unexplained or recurrent                     (1 Point)           (in the previous month)                               (1 Point)  [ ] Abnormal pulmonary function test                     (1 Point)                 SURGERY RELATED RISK FACTORS  [ ] Acute myocardial infarction                              (1 Point)               [ ]  Section                                             (1 Point)  [ ] Congestive heart failure (in the previous month)  (1 Point)      [x ] Minor surgery                                                  (1 Point)   [ ] Inflammatory bowel disease                             (1 Point)               [ ] Arthroscopic surgery                                        (2 Points)  [ ] Central venous access                                      (2 Points)                [ ] General surgery lasting more than 45 minutes (2 points)  [ ] Malignancy- Present or previous                   (2 Points)                [ ] Elective arthroplasty                                         (5 points)    [ ] Stroke (in the previous month)                          (5 Points)                                                                                                                                                           HEMATOLOGY RELATED FACTORS                                                 TRAUMA RELATED RISK FACTORS  [ ] Prior episodes of VTE                                     (3 Points)                [ ] Fracture of the hip, pelvis, or leg                       (5 Points)  [ ] Positive family history for VTE                         (3 Points)             [ ] Acute spinal cord injury (in the previous month)  (5 Points)  [ ] Prothrombin 61552 A                                     (3 Points)               [ ] Paralysis  (less than 1 month)                             (5 Points)  [ ] Factor V Leiden                                             (3 Points)                  [ ] Multiple Trauma within 1 month                        (5 Points)  [ ] Lupus anticoagulants                                     (3 Points)                                                           [ ] Anticardiolipin antibodies                               (3 Points)                                                       [ ] High homocysteine in the blood                      (3 Points)                                             [ ] Other congenital or acquired thrombophilia      (3 Points)                                                [ ] Heparin induced thrombocytopenia                  (3 Points)                                     Total Score [   1       ]  OPIOID RISK TOOL    JOSELYN EACH BOX THAT APPLIES AND ADD TOTALS AT THE END    FAMILY HISTORY OF SUBSTANCE ABUSE                 FEMALE         MALE                                                Alcohol                             [  ]1 pt          [  ]3pts                                               Illegal Drugs                     [  ]2 pts        [  ]3pts                                               Rx Drugs                           [  ]4 pts        [  ]4 pts    PERSONAL HISTORY OF SUBSTANCE ABUSE                                                                                          Alcohol                             [  ]3 pts       [  ]3 pts                                               Illegal Drugs                     [  ]4 pts        [  ]4 pts                                               Rx Drugs                           [  ]5 pts        [  ]5 pts    AGE BETWEEN 16-45 YEARS                                      [ x ]1 pt         [  ]1 pt    HISTORY OF PREADOLESCENT   SEXUAL ABUSE                                                             [  ]3 pts        [  ]0pts    PSYCHOLOGICAL DISEASE                     ADD, OCD, Bipolar, Schizophrenia        [  ]2 pts         [  ]2 pts                      Depression                                               [  ]1 pt           [  ]1 pt           SCORING TOTAL   (add numbers and type here)              ( 1)                                     A score of 3 or lower indicated LOW risk for future opioid abuse  A score of 4 to 7 indicated moderate risk for future opioid abuse  A score of 8 or higher indicates a high risk for opioid abuse

## 2023-05-30 NOTE — H&P PST ADULT - TEMPERATURE IN FAHRENHEIT (DEGREES F)
Stop actos  Wear oxygen at 2 L when you get up and about  Start jardiance 10 mg daily  Continue all other medications  Call Dr Drake Wihte about sleep up titration of bipap [phone]  RTO in 3-4 weeks  Blood work today
97

## 2023-05-30 NOTE — H&P PST ADULT - NSICDXPASTMEDICALHX_GEN_ALL_CORE_FT
PAST MEDICAL HISTORY:  Anemia     Skipped heart beats      PAST MEDICAL HISTORY:  Need for prophylactic measure     Pelvic and perineal pain

## 2023-05-30 NOTE — H&P PST ADULT - HISTORY OF PRESENT ILLNESS
28 y/o female  with PMH of anemia, arrhythmia present to PST today. Reports hx of pelvic and perineal pain for about 10 years. Reports vestibulodynia when she urinates and dyspareunia as well. Started pelvic PT for 3 months without any relief. Reports constipation occasionally. Denies any UTI symptoms. Last pap - Normal.  Scheduled for pudendal nerve block and trigger point injection with botox 2022.   27 y/o female  with no pertinent PMH  present to PST today, Reports hx of pelvic and perineal pain/tightness for more 10 years. 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 She had pelvic PT for few months without any relief. Denies any UTI symptoms. Last pap - Normal. she had a pudendal nerve block and trigger point injection with Botox 2022. Now she is scheduled for a Trigger point Injection with 300 units of Botox, Pudendal nerve Block by Dr. Rea on 23.

## 2023-05-31 LAB
CULTURE RESULTS: SIGNIFICANT CHANGE UP
SPECIMEN SOURCE: SIGNIFICANT CHANGE UP

## 2023-06-15 ENCOUNTER — APPOINTMENT (OUTPATIENT)
Dept: UROGYNECOLOGY | Facility: CLINIC | Age: 28
End: 2023-06-15
Payer: COMMERCIAL

## 2023-06-15 VITALS
WEIGHT: 130 LBS | SYSTOLIC BLOOD PRESSURE: 146 MMHG | HEIGHT: 66 IN | HEART RATE: 87 BPM | OXYGEN SATURATION: 99 % | DIASTOLIC BLOOD PRESSURE: 89 MMHG | BODY MASS INDEX: 20.89 KG/M2

## 2023-06-15 DIAGNOSIS — R10.2 PELVIC AND PERINEAL PAIN: ICD-10-CM

## 2023-06-15 DIAGNOSIS — G89.29 PELVIC AND PERINEAL PAIN: ICD-10-CM

## 2023-06-15 DIAGNOSIS — N94.819 VULVODYNIA, UNSPECIFIED: ICD-10-CM

## 2023-06-15 PROBLEM — Z29.9 ENCOUNTER FOR PROPHYLACTIC MEASURES, UNSPECIFIED: Chronic | Status: ACTIVE | Noted: 2023-05-30

## 2023-06-15 PROCEDURE — 99214 OFFICE O/P EST MOD 30 MIN: CPT

## 2023-06-15 PROCEDURE — 87210 SMEAR WET MOUNT SALINE/INK: CPT | Mod: QW

## 2023-06-15 PROCEDURE — 83986 ASSAY PH BODY FLUID NOS: CPT | Mod: QW

## 2023-06-15 RX ORDER — NORTRIPTYLINE HYDROCHLORIDE 10 MG/1
10 CAPSULE ORAL
Qty: 90 | Refills: 2 | Status: COMPLETED | COMMUNITY
Start: 2022-09-22 | End: 2023-06-15

## 2023-06-15 RX ORDER — DIAZEPAM 100 %
POWDER (GRAM) MISCELLANEOUS
Qty: 30 | Refills: 0 | Status: COMPLETED | COMMUNITY
Start: 2022-04-25 | End: 2023-06-15

## 2023-06-15 NOTE — PHYSICAL EXAM
[No Acute Distress] : in no acute distress [Well developed] : well developed [Well Nourished] : ~L well nourished [Good Hygeine] : demonstrates good hygeine [Oriented x3] : oriented to person, place, and time [Normal Memory] : ~T memory was ~L unimpaired [Normal Mood/Affect] : mood and affect are normal [No Edema] : ~T edema was not present [None] : no CVA tenderness [Warm and Dry] : was warm and dry to touch [Normal Gait] : gait was normal [No Lesions] : no lesions were seen on the external genitalia [Labia Majora] : were normal [Labia Minora] : were normal [Normal Appearance] : general appearance was normal [Pink Rugae] : pink rugae [No Bleeding] : there was no active vaginal bleeding [Normal] : no abnormalities [Exam Deferred] : was deferred [Tenderness] : ~T no ~M abdominal tenderness observed [Distended] : not distended [de-identified] : Q-tip touch test 5/10 @ 1, 5,6,7,11,12; +reactive erythema at ostia, no fissures, erosions, ulcerations [FreeTextEntry4] : PFMs 2/4 with tender point L PC. No discrete trigger points palpable.

## 2023-06-15 NOTE — HISTORY OF PRESENT ILLNESS
[FreeTextEntry1] : Charlene is here for a f/u visit after a 9 month hiatus. \par \par She has been seeing Dr. Rea for Botox injections.\par She had Botox injections 6 months ago and is scheduled for repeat injections end of July.\par \par She is not using topical cream. Used topical E/G10% x 3 months without improvement of her LPV so she stopped.\par Not using any po meds and does not want to start any.  She states she tried nortriptyline 30mg QD x 3-4 months without improvement so she stopped.\par Not using B20 suppositories. States they did not help. \par Stopped PFPT b/c it didn't help her. \par \par Her GYN gave her BIHR b/c she feels "dry and burning".\par \par States muscles are not bothersome, just vulvar tissue. \par Not sexually active. \par \par She has questions regarding various treatments: topical steroids, oral progesterone, vulvar biopsy.

## 2023-06-15 NOTE — DISCUSSION/SUMMARY
[FreeTextEntry1] : Long discussion re: treatment options available for LPV and what is not recommended.\par Offered to start Charlene on oral neuroleptic, but she does not want to start any po meds.\par Explained no need for vulvar bx as her vulvar tissue is wnl (no dryness noted, no derm changes noted). 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 \par \par Recommended a topical cream. \par ETA topical cream ftp to [facility]. Amount and location of cream application was demonstrated to patient today in office via mirror demonstration. I explained that the cream does not work overnight and she needs to continue it for a minimum of 6-8 weeks before we re-evaluate efficacy.\par \par t/c intralesional injections. R/B/A rev'd.\par \par She is going to proceed with Botox with Dr. Rea. \par \par Wet prep done in office\par pH=4.0, +lacto, +epithelial cells, no hyphae, no clue cells, no whiff, no trich, no WBC, no parabasal cells\par KOH prep neg hyphae\par \par RTO 2-3 months if needed\par \par

## 2023-06-21 ENCOUNTER — NON-APPOINTMENT (OUTPATIENT)
Age: 28
End: 2023-06-21

## 2023-07-18 ENCOUNTER — OUTPATIENT (OUTPATIENT)
Dept: OUTPATIENT SERVICES | Facility: HOSPITAL | Age: 28
LOS: 1 days | End: 2023-07-18
Payer: COMMERCIAL

## 2023-07-18 VITALS
DIASTOLIC BLOOD PRESSURE: 60 MMHG | TEMPERATURE: 98 F | SYSTOLIC BLOOD PRESSURE: 100 MMHG | RESPIRATION RATE: 20 BRPM | WEIGHT: 134.48 LBS | HEIGHT: 66 IN | HEART RATE: 62 BPM | OXYGEN SATURATION: 98 %

## 2023-07-18 DIAGNOSIS — M79.10 MYALGIA, UNSPECIFIED SITE: ICD-10-CM

## 2023-07-18 DIAGNOSIS — Z01.812 ENCOUNTER FOR PREPROCEDURAL LABORATORY EXAMINATION: ICD-10-CM

## 2023-07-18 DIAGNOSIS — M79.18 MYALGIA, OTHER SITE: ICD-10-CM

## 2023-07-18 DIAGNOSIS — R10.2 PELVIC AND PERINEAL PAIN: ICD-10-CM

## 2023-07-18 DIAGNOSIS — Z87.42 PERSONAL HISTORY OF OTHER DISEASES OF THE FEMALE GENITAL TRACT: Chronic | ICD-10-CM

## 2023-07-18 DIAGNOSIS — Z29.9 ENCOUNTER FOR PROPHYLACTIC MEASURES, UNSPECIFIED: ICD-10-CM

## 2023-07-18 DIAGNOSIS — Z13.89 ENCOUNTER FOR SCREENING FOR OTHER DISORDER: ICD-10-CM

## 2023-07-18 LAB
A1C WITH ESTIMATED AVERAGE GLUCOSE RESULT: 5.1 % — SIGNIFICANT CHANGE UP (ref 4–5.6)
ANION GAP SERPL CALC-SCNC: 12 MMOL/L — SIGNIFICANT CHANGE UP (ref 5–17)
APPEARANCE UR: CLEAR — SIGNIFICANT CHANGE UP
APTT BLD: 31.8 SEC — SIGNIFICANT CHANGE UP (ref 27.5–35.5)
BACTERIA # UR AUTO: ABNORMAL
BASOPHILS # BLD AUTO: 0.09 K/UL — SIGNIFICANT CHANGE UP (ref 0–0.2)
BASOPHILS NFR BLD AUTO: 1.3 % — SIGNIFICANT CHANGE UP (ref 0–2)
BILIRUB UR-MCNC: NEGATIVE — SIGNIFICANT CHANGE UP
BUN SERPL-MCNC: 7.6 MG/DL — LOW (ref 8–20)
CALCIUM SERPL-MCNC: 9.8 MG/DL — SIGNIFICANT CHANGE UP (ref 8.4–10.5)
CHLORIDE SERPL-SCNC: 101 MMOL/L — SIGNIFICANT CHANGE UP (ref 96–108)
CO2 SERPL-SCNC: 23 MMOL/L — SIGNIFICANT CHANGE UP (ref 22–29)
COLOR SPEC: YELLOW — SIGNIFICANT CHANGE UP
CREAT SERPL-MCNC: 0.63 MG/DL — SIGNIFICANT CHANGE UP (ref 0.5–1.3)
DIFF PNL FLD: ABNORMAL
EGFR: 125 ML/MIN/1.73M2 — SIGNIFICANT CHANGE UP
EOSINOPHIL # BLD AUTO: 0.17 K/UL — SIGNIFICANT CHANGE UP (ref 0–0.5)
EOSINOPHIL NFR BLD AUTO: 2.4 % — SIGNIFICANT CHANGE UP (ref 0–6)
EPI CELLS # UR: SIGNIFICANT CHANGE UP
ESTIMATED AVERAGE GLUCOSE: 100 MG/DL — SIGNIFICANT CHANGE UP (ref 68–114)
GLUCOSE SERPL-MCNC: 97 MG/DL — SIGNIFICANT CHANGE UP (ref 70–99)
GLUCOSE UR QL: NEGATIVE MG/DL — SIGNIFICANT CHANGE UP
HCG SERPL-ACNC: <4 MIU/ML — SIGNIFICANT CHANGE UP
HCT VFR BLD CALC: 38.7 % — SIGNIFICANT CHANGE UP (ref 34.5–45)
HGB BLD-MCNC: 12.5 G/DL — SIGNIFICANT CHANGE UP (ref 11.5–15.5)
IMM GRANULOCYTES NFR BLD AUTO: 0.1 % — SIGNIFICANT CHANGE UP (ref 0–0.9)
INR BLD: 1.07 RATIO — SIGNIFICANT CHANGE UP (ref 0.88–1.16)
KETONES UR-MCNC: NEGATIVE — SIGNIFICANT CHANGE UP
LEUKOCYTE ESTERASE UR-ACNC: NEGATIVE — SIGNIFICANT CHANGE UP
LYMPHOCYTES # BLD AUTO: 1.54 K/UL — SIGNIFICANT CHANGE UP (ref 1–3.3)
LYMPHOCYTES # BLD AUTO: 21.9 % — SIGNIFICANT CHANGE UP (ref 13–44)
MCHC RBC-ENTMCNC: 28.2 PG — SIGNIFICANT CHANGE UP (ref 27–34)
MCHC RBC-ENTMCNC: 32.3 GM/DL — SIGNIFICANT CHANGE UP (ref 32–36)
MCV RBC AUTO: 87.4 FL — SIGNIFICANT CHANGE UP (ref 80–100)
MONOCYTES # BLD AUTO: 0.29 K/UL — SIGNIFICANT CHANGE UP (ref 0–0.9)
MONOCYTES NFR BLD AUTO: 4.1 % — SIGNIFICANT CHANGE UP (ref 2–14)
NEUTROPHILS # BLD AUTO: 4.92 K/UL — SIGNIFICANT CHANGE UP (ref 1.8–7.4)
NEUTROPHILS NFR BLD AUTO: 70.2 % — SIGNIFICANT CHANGE UP (ref 43–77)
NITRITE UR-MCNC: NEGATIVE — SIGNIFICANT CHANGE UP
PH UR: 6.5 — SIGNIFICANT CHANGE UP (ref 5–8)
PLATELET # BLD AUTO: 248 K/UL — SIGNIFICANT CHANGE UP (ref 150–400)
POTASSIUM SERPL-MCNC: 5.1 MMOL/L — SIGNIFICANT CHANGE UP (ref 3.5–5.3)
POTASSIUM SERPL-SCNC: 5.1 MMOL/L — SIGNIFICANT CHANGE UP (ref 3.5–5.3)
PROT UR-MCNC: NEGATIVE — SIGNIFICANT CHANGE UP
PROTHROM AB SERPL-ACNC: 12.4 SEC — SIGNIFICANT CHANGE UP (ref 10.5–13.4)
RBC # BLD: 4.43 M/UL — SIGNIFICANT CHANGE UP (ref 3.8–5.2)
RBC # FLD: 13.1 % — SIGNIFICANT CHANGE UP (ref 10.3–14.5)
RBC CASTS # UR COMP ASSIST: ABNORMAL /HPF (ref 0–4)
SODIUM SERPL-SCNC: 136 MMOL/L — SIGNIFICANT CHANGE UP (ref 135–145)
SP GR SPEC: 1.01 — SIGNIFICANT CHANGE UP (ref 1.01–1.02)
UROBILINOGEN FLD QL: NEGATIVE MG/DL — SIGNIFICANT CHANGE UP
WBC # BLD: 7.02 K/UL — SIGNIFICANT CHANGE UP (ref 3.8–10.5)
WBC # FLD AUTO: 7.02 K/UL — SIGNIFICANT CHANGE UP (ref 3.8–10.5)
WBC UR QL: SIGNIFICANT CHANGE UP /HPF (ref 0–5)

## 2023-07-18 PROCEDURE — G0463: CPT

## 2023-07-18 RX ORDER — PRASTERONE 6.5 MG/1
0.5 INSERT VAGINAL
Refills: 0 | DISCHARGE

## 2023-07-18 NOTE — H&P PST ADULT - NSICDXPASTMEDICALHX_GEN_ALL_CORE_FT
PAST MEDICAL HISTORY:  Need for prophylactic measure     Pelvic and perineal pain      PAST MEDICAL HISTORY:  History of ovarian cyst     Need for prophylactic measure     Pelvic and perineal pain

## 2023-07-18 NOTE — H&P PST ADULT - PROBLEM SELECTOR PLAN 3
CAP score1 patient Low Risk,  SCDs ordered for day of procedure.  Surgical team to assess need for VTE Prophylaxis

## 2023-07-18 NOTE — H&P PST ADULT - HISTORY OF PRESENT ILLNESS
Patient is a 24 year old  female , presenting today for PST, PMH includes vestibulodynia  present to PST today, Patient c/o longstanding history of pelvic and perineal pain.       Now scheduled for trigger point injections with 300 units of Botox, pudendal nerve block on 2023 with Dr. Rea.    . 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 She had pelvic PT for few months without any relief. Denies any UTI symptoms. Last pap - Normal. she had a pudendal nerve block and trigger point injection with Botox 2022. Now she is scheduled for a Trigger point Injection with 300 units of Botox, Pudendal nerve Block by Dr. Rea on 23.     Patient is a 24 year old  female , presenting today for PST, PMH includes vestibulodynia  present to PST today, Patient c/o longstanding history of pelvic and perineal pain. 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 She had pelvic PT for few months without any relief. Denies any UTI symptoms. Last pap - Normal. she had a pudendal nerve block and trigger point injection with Botox 2022 with relief. States in late April symptoms returned.  She reports burning and discomfort with intercourse. Now she is scheduled for a Trigger point Injection with 300 units of Botox, Pudendal nerve Block with  Dr. Rea on 2023. She was previously scheduled ot have procedure on 2023 but due to insurance changes procedure was postponed.      Patient is a 28 year old  female ,  LMP 2023, presenting today for PST, PMH includes ovarian cyst. Patient c/o longstanding history of pelvic and perineal pain. 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 She had pelvic PT for few months without any relief. Denies any UTI symptoms. Last pap - Normal. she had a pudendal nerve block and trigger point injection with Botox 2022 with relief. States in late April symptoms returned.  She reports burning and discomfort with intercourse. Now she is scheduled for a Trigger point Injection with 300 units of Botox, Pudendal nerve Block with  Dr. Rea on 2023. She was previously scheduled ot have procedure on 2023 but due to insurance changes procedure was postponed.

## 2023-07-18 NOTE — H&P PST ADULT - PROBLEM SELECTOR PLAN 1
Labs, UA and U C&S performed.  Scheduled for a Trigger point Injection with 300 units of Botox, Pudendal nerve Block with  Dr. Rea on 7/26/2023.   Written and verbal instructions provided.  Patient educated on liquids before surgery, preadmission instructions, and day of procedure medications, verbalizes understanding.  Patient instructed to stop vitamins/supplements/herbal medications/ASA/NSAIDS for one week prior to surgery and discuss with PMD, verbalized understanding.  Patient verbalized understanding of instructions and was given the opportunity to ask questions and have them answered.  Out patient medications reviewed  and verified with patient.

## 2023-07-18 NOTE — H&P PST ADULT - MUSCULOSKELETAL
yes/L5-S1 decompression and fusion. negative normal/ROM intact/no calf tenderness/normal gait/strength 5/5 bilateral upper extremities/strength 5/5 bilateral lower extremities

## 2023-07-18 NOTE — H&P PST ADULT - ASSESSMENT
This is a pleasant 28 year old  female , LMP 2023, in NAD presenting today for PST, PMH includes ovarian cyst. Patient c/o longstanding history of pelvic and perineal pain. 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 She had pelvic PT for few months without any relief. Denies any UTI symptoms. Last pap - Normal. she had a pudendal nerve block and trigger point injection with Botox 2022 with relief. States in late April symptoms returned.  She reports burning and discomfort with intercourse. Now she is scheduled for a Trigger point Injection with 300 units of Botox, Pudendal nerve Block with  Dr. Rea on 2023. She was previously scheduled ot have procedure on 2023 but due to insurance changes procedure was postponed.       CAPRINI SCORE    AGE RELATED RISK FACTORS                                                             [ ] Age 41-60 years                                            (1 Point)  [ ] Age: 61-74 years                                           (2 Points)                 [ ] Age= 75 years                                                (3 Points)             DISEASE RELATED RISK FACTORS                                                       [ ] Edema in the lower extremities                 (1 Point)                     [ ] Varicose veins                                               (1 Point)                                 [ ] BMI > 25 Kg/m2                                            (1 Point)                                  [ ] Serious infection (ie PNA)                            (1 Point)                     [ ] Lung disease ( COPD, Emphysema)            (1 Point)                                                                          [ ] Acute myocardial infarction                         (1 Point)                  [ ] Congestive heart failure (in the previous month)  (1 Point)         [ ] Inflammatory bowel disease                            (1 Point)                  [ ] Central venous access, PICC or Port               (2 points)       (within the last month)                                                                [ ] Stroke (in the previous month)                        (5 Points)    [ ] Previous or present malignancy                       (2 points)                                                                                                                                                         HEMATOLOGY RELATED FACTORS                                                         [ ] Prior episodes of VTE                                     (3 Points)                     [ ] Positive family history for VTE                      (3 Points)                  [ ] Prothrombin 46603 A                                     (3 Points)                     [ ] Factor V Leiden                                                (3 Points)                        [ ] Lupus anticoagulants                                      (3 Points)                                                           [ ] Anticardiolipin antibodies                              (3 Points)                                                       [ ] High homocysteine in the blood                   (3 Points)                                             [ ] Other congenital or acquired thrombophilia      (3 Points)                                                [ ] Heparin induced thrombocytopenia                  (3 Points)                                        MOBILITY RELATED FACTORS  [ ] Bed rest                                                         (1 Point)  [ ] Plaster cast                                                    (2 points)  [ ] Bed bound for more than 72 hours           (2 Points)    GENDER SPECIFIC FACTORS  [ ] Pregnancy or had a baby within the last month   (1 Point)  [ ] Post-partum < 6 weeks                                   (1 Point)  [ ] Hormonal therapy  or oral contraception   (1 Point)  [ ] History of pregnancy complications              (1 point)  [ ] Unexplained or recurrent              (1 Point)    OTHER RISK FACTORS                                           (1 Point)  [ ] BMI >40, smoking, diabetes requiring insulin, chemotherapy  blood transfusions and length of surgery over 2 hours    SURGERY RELATED RISK FACTORS  [ ]  Section within the last month     (1 Point)  [X ] Minor surgery                                                  (1 Point)  [ ] Arthroscopic surgery                                       (2 Points)  [ ] Planned major surgery lasting more            (2 Points)      than 45 minutes     [ ] Elective hip or knee joint replacement       (5 points)       surgery                                                TRAUMA RELATED RISK FACTORS  [ ] Fracture of the hip, pelvis, or leg                       (5 Points)  [ ] Spinal cord injury resulting in paralysis             (5 points)       (in the previous month)    [ ] Paralysis  (less than 1 month)                             (5 Points)  [ ] Multiple Trauma within 1 month                        (5 Points)    Total Score [      1  ]    Caprini Score 0-2: Low Risk, NO VTE prophylaxis required for most patients, encourage ambulation  Caprini Score 3-6: Moderate Risk , pharmacologic VTE prophylaxis is indicated for most patients (in the absence of contraindications)  Caprini Score Greater than or =7: High risk, pharmocologic VTE prophylaxis indicated for most patients (in the absence of contraindications)    OPIOID RISK TOOL    JOSELYN EACH BOX THAT APPLIES AND ADD TOTALS AT THE END    FAMILY HISTORY OF SUBSTANCE ABUSE                 FEMALE         MALE                                                Alcohol                             [  ]1 pt          [  ]3pts                                               Illegal Durgs                     [  ]2 pts        [  ]3pts                                               Rx Drugs                           [  ]4 pts        [  ]4 pts    PERSONAL HISTORY OF SUBSTANCE ABUSE                                                                                          Alcohol                             [  ]3 pts       [  ]3 pts                                               Illegal Drugs                     [  ]4 pts        [  ]4 pts                                               Rx Drugs                           [  ]5 pts        [  ]5 pts    AGE BETWEEN 16-45 YEARS                                      [X  ]1 pt         [  ]1 pt    HISTORY OF PREADOLESCENT   SEXUAL ABUSE                                                             [  ]3 pts        [  ]0pts    PSYCHOLOGICAL DISEASE                     ADD, OCD, Bipolar, Schizophrenia        [  ]2 pts         [  ]2 pts                      Depression                                               [  ]1 pt           [  ]1 pt           SCORING TOTAL   (add numbers and type here)              (**1*)                                     A score of 3 or lower indicated LOW risk for future opioid abuse  A score of 4 to 7 indicated moderate risk for future opioid abuse  A score of 8 or higher indicates a high risk for opioid abuse

## 2023-07-18 NOTE — H&P PST ADULT - NSICDXFAMILYHX_GEN_ALL_CORE_FT
FAMILY HISTORY:  No pertinent family history in first degree relatives     FAMILY HISTORY:  Father  Still living? Unknown  Family history of high cholesterol, Age at diagnosis: Age Unknown  FH: HTN (hypertension), Age at diagnosis: Age Unknown    Mother  Still living? Unknown  FH: thyroid disease, Age at diagnosis: Age Unknown

## 2023-07-19 LAB
CULTURE RESULTS: SIGNIFICANT CHANGE UP
SPECIMEN SOURCE: SIGNIFICANT CHANGE UP

## 2023-07-25 ENCOUNTER — TRANSCRIPTION ENCOUNTER (OUTPATIENT)
Age: 28
End: 2023-07-25

## 2023-07-25 ENCOUNTER — APPOINTMENT (OUTPATIENT)
Dept: UROGYNECOLOGY | Facility: CLINIC | Age: 28
End: 2023-07-25
Payer: COMMERCIAL

## 2023-07-25 VITALS
DIASTOLIC BLOOD PRESSURE: 75 MMHG | WEIGHT: 131 LBS | HEIGHT: 66 IN | SYSTOLIC BLOOD PRESSURE: 118 MMHG | BODY MASS INDEX: 21.05 KG/M2

## 2023-07-25 DIAGNOSIS — M62.89 OTHER SPECIFIED DISORDERS OF MUSCLE: ICD-10-CM

## 2023-07-25 DIAGNOSIS — N94.10 UNSPECIFIED DYSPAREUNIA: ICD-10-CM

## 2023-07-25 PROCEDURE — 51798 US URINE CAPACITY MEASURE: CPT

## 2023-07-25 PROCEDURE — 99214 OFFICE O/P EST MOD 30 MIN: CPT | Mod: 25

## 2023-07-25 NOTE — ASU PATIENT PROFILE, ADULT - NSICDXPASTMEDICALHX_GEN_ALL_CORE_FT
PAST MEDICAL HISTORY:  History of ovarian cyst     Need for prophylactic measure     Pelvic and perineal pain

## 2023-07-25 NOTE — DISCUSSION/SUMMARY
[FreeTextEntry1] : We discussed the R/B/A nerve block and trigger point injections. I mapped her today and would inject 5mls of BTX into the perineal body and then 3 mls on left levator and OI and 2 mls on right OI and levator. Full informed consent was obtained. Risk of exacerbation of pain, not working as well as it has in the past, bleeding all discussed.

## 2023-07-25 NOTE — PHYSICAL EXAM
[Chaperone Present] : A chaperone was present in the examining room during all aspects of the physical examination [Normal] : normal external genitalia [Normal Appearance] : general appearance was normal [FreeTextEntry1] : Marquise [FreeTextEntry4] : spasm in perineal body, bulbocavernosus levator ani and OI L>R

## 2023-07-25 NOTE — HISTORY OF PRESENT ILLNESS
[FreeTextEntry1] : Charlene had a B/L pudendal nerve block and BTX injection into her pelvic floor muscles in November. She reports significant reduction in her burning for about 6 months. It started back up in April and she has been using compounding cream but wants to do another injection.

## 2023-07-26 ENCOUNTER — OUTPATIENT (OUTPATIENT)
Dept: INPATIENT UNIT | Facility: HOSPITAL | Age: 28
LOS: 1 days | End: 2023-07-26
Payer: COMMERCIAL

## 2023-07-26 ENCOUNTER — TRANSCRIPTION ENCOUNTER (OUTPATIENT)
Age: 28
End: 2023-07-26

## 2023-07-26 ENCOUNTER — APPOINTMENT (OUTPATIENT)
Dept: UROGYNECOLOGY | Facility: HOSPITAL | Age: 28
End: 2023-07-26

## 2023-07-26 VITALS
TEMPERATURE: 98 F | OXYGEN SATURATION: 100 % | HEART RATE: 87 BPM | RESPIRATION RATE: 16 BRPM | HEIGHT: 66 IN | DIASTOLIC BLOOD PRESSURE: 76 MMHG | SYSTOLIC BLOOD PRESSURE: 136 MMHG | WEIGHT: 134.48 LBS

## 2023-07-26 VITALS
HEART RATE: 65 BPM | SYSTOLIC BLOOD PRESSURE: 115 MMHG | RESPIRATION RATE: 19 BRPM | OXYGEN SATURATION: 100 % | DIASTOLIC BLOOD PRESSURE: 74 MMHG | TEMPERATURE: 97 F

## 2023-07-26 DIAGNOSIS — R10.2 PELVIC AND PERINEAL PAIN: ICD-10-CM

## 2023-07-26 DIAGNOSIS — Z87.42 PERSONAL HISTORY OF OTHER DISEASES OF THE FEMALE GENITAL TRACT: Chronic | ICD-10-CM

## 2023-07-26 DIAGNOSIS — Z01.818 ENCOUNTER FOR OTHER PREPROCEDURAL EXAMINATION: ICD-10-CM

## 2023-07-26 DIAGNOSIS — M79.18 MYALGIA, OTHER SITE: ICD-10-CM

## 2023-07-26 PROCEDURE — 20553 NJX 1/MLT TRIGGER POINTS 3/>: CPT

## 2023-07-26 PROCEDURE — 64430 NJX AA&/STRD PUDENDAL NERVE: CPT | Mod: 50,59

## 2023-07-26 RX ORDER — SODIUM CHLORIDE 9 MG/ML
3 INJECTION INTRAMUSCULAR; INTRAVENOUS; SUBCUTANEOUS ONCE
Refills: 0 | Status: DISCONTINUED | OUTPATIENT
Start: 2023-07-26 | End: 2023-07-26

## 2023-07-26 RX ORDER — ONDANSETRON 8 MG/1
4 TABLET, FILM COATED ORAL ONCE
Refills: 0 | Status: DISCONTINUED | OUTPATIENT
Start: 2023-07-26 | End: 2023-07-26

## 2023-07-26 RX ORDER — CELECOXIB 200 MG/1
400 CAPSULE ORAL ONCE
Refills: 0 | Status: COMPLETED | OUTPATIENT
Start: 2023-07-26 | End: 2023-07-26

## 2023-07-26 RX ORDER — ACETAMINOPHEN 500 MG
975 TABLET ORAL ONCE
Refills: 0 | Status: COMPLETED | OUTPATIENT
Start: 2023-07-26 | End: 2023-07-26

## 2023-07-26 RX ORDER — FENTANYL CITRATE 50 UG/ML
50 INJECTION INTRAVENOUS
Refills: 0 | Status: DISCONTINUED | OUTPATIENT
Start: 2023-07-26 | End: 2023-07-26

## 2023-07-26 RX ORDER — SODIUM CHLORIDE 9 MG/ML
1000 INJECTION, SOLUTION INTRAVENOUS
Refills: 0 | Status: DISCONTINUED | OUTPATIENT
Start: 2023-07-26 | End: 2023-07-26

## 2023-07-26 RX ADMIN — Medication 975 MILLIGRAM(S): at 09:14

## 2023-07-26 RX ADMIN — CELECOXIB 400 MILLIGRAM(S): 200 CAPSULE ORAL at 09:14

## 2023-07-26 NOTE — BRIEF OPERATIVE NOTE - NSICDXBRIEFPOSTOP_GEN_ALL_CORE_FT
POST-OP DIAGNOSIS:  Pelvic floor dysfunction 26-Jul-2023 12:17:54  Leann Rizo  Vulvodynia 26-Jul-2023 12:18:21  Leann Rizo

## 2023-07-26 NOTE — ASU DISCHARGE PLAN (ADULT/PEDIATRIC) - NS MD DC FALL RISK RISK
For information on Fall & Injury Prevention, visit: [email or web address] OR  [email or web address] OR  https://www.cdc.gov/steadi/patient.html

## 2023-07-26 NOTE — ASU DISCHARGE PLAN (ADULT/PEDIATRIC) - CARE PROVIDER_API CALL
Josh Rea  Urogynecology  [address] [place] [id number]  Phone: [phone]  Fax: [phone]  Established Patient  Follow Up Time:

## 2023-07-26 NOTE — BRIEF OPERATIVE NOTE - NSICDXBRIEFPREOP_GEN_ALL_CORE_FT
PRE-OP DIAGNOSIS:  Pelvic floor dysfunction 26-Jul-2023 12:17:49  Leann Rizo  Vulvodynia 26-Jul-2023 12:18:17  Leann Rizo

## 2023-07-26 NOTE — BRIEF OPERATIVE NOTE - NSICDXBRIEFPROCEDURE_GEN_ALL_CORE_FT
PROCEDURES:  Injection, trigger point, single, 3+ muscles 26-Jul-2023 12:17:25  Leann Rizo  Pudendal nerve block 26-Jul-2023 12:17:36  Leann Rizo

## 2023-08-10 ENCOUNTER — APPOINTMENT (OUTPATIENT)
Dept: UROGYNECOLOGY | Facility: CLINIC | Age: 28
End: 2023-08-10

## 2023-08-24 ENCOUNTER — APPOINTMENT (OUTPATIENT)
Dept: UROGYNECOLOGY | Facility: CLINIC | Age: 28
End: 2023-08-24

## 2023-09-19 ENCOUNTER — APPOINTMENT (OUTPATIENT)
Dept: UROGYNECOLOGY | Facility: CLINIC | Age: 28
End: 2023-09-19

## 2023-10-24 ENCOUNTER — APPOINTMENT (OUTPATIENT)
Dept: UROGYNECOLOGY | Facility: CLINIC | Age: 28
End: 2023-10-24

## 2023-11-15 NOTE — H&P PST ADULT - HEART RATE (BEATS/MIN)
Please place UA/UC order for upcoming cystoscopy with Dr. Coello.    Thank you,    Irish [facility] Assistant- Surgical Specialties      [phone]

## 2023-12-01 ENCOUNTER — NON-APPOINTMENT (OUTPATIENT)
Age: 28
End: 2023-12-01

## 2023-12-01 PROBLEM — Z87.42 PERSONAL HISTORY OF OTHER DISEASES OF THE FEMALE GENITAL TRACT: Chronic | Status: ACTIVE | Noted: 2023-07-18

## 2023-12-07 ENCOUNTER — NON-APPOINTMENT (OUTPATIENT)
Age: 28
End: 2023-12-07

## 2023-12-28 ENCOUNTER — APPOINTMENT (OUTPATIENT)
Dept: UROGYNECOLOGY | Facility: CLINIC | Age: 28
End: 2023-12-28

## 2024-01-22 ENCOUNTER — APPOINTMENT (OUTPATIENT)
Dept: UROGYNECOLOGY | Facility: CLINIC | Age: 29
End: 2024-01-22

## 2024-02-02 ENCOUNTER — OUTPATIENT (OUTPATIENT)
Dept: OUTPATIENT SERVICES | Facility: HOSPITAL | Age: 29
LOS: 1 days | End: 2024-02-02
Payer: COMMERCIAL

## 2024-02-02 VITALS
RESPIRATION RATE: 12 BRPM | SYSTOLIC BLOOD PRESSURE: 100 MMHG | TEMPERATURE: 97 F | HEIGHT: 66 IN | OXYGEN SATURATION: 100 % | WEIGHT: 130.07 LBS | HEART RATE: 72 BPM | DIASTOLIC BLOOD PRESSURE: 70 MMHG

## 2024-02-02 DIAGNOSIS — M79.18 MYALGIA, OTHER SITE: ICD-10-CM

## 2024-02-02 DIAGNOSIS — Z29.9 ENCOUNTER FOR PROPHYLACTIC MEASURES, UNSPECIFIED: ICD-10-CM

## 2024-02-02 DIAGNOSIS — R10.2 PELVIC AND PERINEAL PAIN: ICD-10-CM

## 2024-02-02 DIAGNOSIS — Z87.42 PERSONAL HISTORY OF OTHER DISEASES OF THE FEMALE GENITAL TRACT: Chronic | ICD-10-CM

## 2024-02-02 DIAGNOSIS — Z13.89 ENCOUNTER FOR SCREENING FOR OTHER DISORDER: ICD-10-CM

## 2024-02-02 DIAGNOSIS — Z01.818 ENCOUNTER FOR OTHER PREPROCEDURAL EXAMINATION: ICD-10-CM

## 2024-02-02 LAB
A1C WITH ESTIMATED AVERAGE GLUCOSE RESULT: 4.8 % — SIGNIFICANT CHANGE UP (ref 4–5.6)
ANION GAP SERPL CALC-SCNC: 11 MMOL/L — SIGNIFICANT CHANGE UP (ref 5–17)
APPEARANCE UR: CLEAR — SIGNIFICANT CHANGE UP
BASOPHILS # BLD AUTO: 0.06 K/UL — SIGNIFICANT CHANGE UP (ref 0–0.2)
BASOPHILS NFR BLD AUTO: 0.8 % — SIGNIFICANT CHANGE UP (ref 0–2)
BILIRUB UR-MCNC: NEGATIVE — SIGNIFICANT CHANGE UP
BUN SERPL-MCNC: 8.9 MG/DL — SIGNIFICANT CHANGE UP (ref 8–20)
CALCIUM SERPL-MCNC: 9.4 MG/DL — SIGNIFICANT CHANGE UP (ref 8.4–10.5)
CHLORIDE SERPL-SCNC: 103 MMOL/L — SIGNIFICANT CHANGE UP (ref 96–108)
CO2 SERPL-SCNC: 24 MMOL/L — SIGNIFICANT CHANGE UP (ref 22–29)
COLOR SPEC: YELLOW — SIGNIFICANT CHANGE UP
CREAT SERPL-MCNC: 0.63 MG/DL — SIGNIFICANT CHANGE UP (ref 0.5–1.3)
DIFF PNL FLD: NEGATIVE — SIGNIFICANT CHANGE UP
EGFR: 125 ML/MIN/1.73M2 — SIGNIFICANT CHANGE UP
EOSINOPHIL # BLD AUTO: 0.06 K/UL — SIGNIFICANT CHANGE UP (ref 0–0.5)
EOSINOPHIL NFR BLD AUTO: 0.8 % — SIGNIFICANT CHANGE UP (ref 0–6)
ESTIMATED AVERAGE GLUCOSE: 91 MG/DL — SIGNIFICANT CHANGE UP (ref 68–114)
GLUCOSE SERPL-MCNC: 84 MG/DL — SIGNIFICANT CHANGE UP (ref 70–99)
GLUCOSE UR QL: NEGATIVE MG/DL — SIGNIFICANT CHANGE UP
HCG UR QL: NEGATIVE — SIGNIFICANT CHANGE UP
HCT VFR BLD CALC: 39.5 % — SIGNIFICANT CHANGE UP (ref 34.5–45)
HGB BLD-MCNC: 12.8 G/DL — SIGNIFICANT CHANGE UP (ref 11.5–15.5)
IMM GRANULOCYTES NFR BLD AUTO: 0.1 % — SIGNIFICANT CHANGE UP (ref 0–0.9)
KETONES UR-MCNC: NEGATIVE MG/DL — SIGNIFICANT CHANGE UP
LEUKOCYTE ESTERASE UR-ACNC: NEGATIVE — SIGNIFICANT CHANGE UP
LYMPHOCYTES # BLD AUTO: 2 K/UL — SIGNIFICANT CHANGE UP (ref 1–3.3)
LYMPHOCYTES # BLD AUTO: 28 % — SIGNIFICANT CHANGE UP (ref 13–44)
MCHC RBC-ENTMCNC: 28.1 PG — SIGNIFICANT CHANGE UP (ref 27–34)
MCHC RBC-ENTMCNC: 32.4 GM/DL — SIGNIFICANT CHANGE UP (ref 32–36)
MCV RBC AUTO: 86.6 FL — SIGNIFICANT CHANGE UP (ref 80–100)
MONOCYTES # BLD AUTO: 0.45 K/UL — SIGNIFICANT CHANGE UP (ref 0–0.9)
MONOCYTES NFR BLD AUTO: 6.3 % — SIGNIFICANT CHANGE UP (ref 2–14)
NEUTROPHILS # BLD AUTO: 4.56 K/UL — SIGNIFICANT CHANGE UP (ref 1.8–7.4)
NEUTROPHILS NFR BLD AUTO: 64 % — SIGNIFICANT CHANGE UP (ref 43–77)
NITRITE UR-MCNC: NEGATIVE — SIGNIFICANT CHANGE UP
PH UR: 5.5 — SIGNIFICANT CHANGE UP (ref 5–8)
PLATELET # BLD AUTO: 295 K/UL — SIGNIFICANT CHANGE UP (ref 150–400)
POTASSIUM SERPL-MCNC: 4.7 MMOL/L — SIGNIFICANT CHANGE UP (ref 3.5–5.3)
POTASSIUM SERPL-SCNC: 4.7 MMOL/L — SIGNIFICANT CHANGE UP (ref 3.5–5.3)
PROT UR-MCNC: NEGATIVE MG/DL — SIGNIFICANT CHANGE UP
RBC # BLD: 4.56 M/UL — SIGNIFICANT CHANGE UP (ref 3.8–5.2)
RBC # FLD: 13.2 % — SIGNIFICANT CHANGE UP (ref 10.3–14.5)
SODIUM SERPL-SCNC: 138 MMOL/L — SIGNIFICANT CHANGE UP (ref 135–145)
SP GR SPEC: 1.02 — SIGNIFICANT CHANGE UP (ref 1–1.03)
UROBILINOGEN FLD QL: 0.2 MG/DL — SIGNIFICANT CHANGE UP (ref 0.2–1)
WBC # BLD: 7.14 K/UL — SIGNIFICANT CHANGE UP (ref 3.8–10.5)
WBC # FLD AUTO: 7.14 K/UL — SIGNIFICANT CHANGE UP (ref 3.8–10.5)

## 2024-02-02 PROCEDURE — G0463: CPT

## 2024-02-02 PROCEDURE — 80048 BASIC METABOLIC PNL TOTAL CA: CPT

## 2024-02-02 PROCEDURE — 87086 URINE CULTURE/COLONY COUNT: CPT

## 2024-02-02 PROCEDURE — 81003 URINALYSIS AUTO W/O SCOPE: CPT

## 2024-02-02 PROCEDURE — 85025 COMPLETE CBC W/AUTO DIFF WBC: CPT

## 2024-02-02 PROCEDURE — 83036 HEMOGLOBIN GLYCOSYLATED A1C: CPT

## 2024-02-02 PROCEDURE — 81025 URINE PREGNANCY TEST: CPT

## 2024-02-02 PROCEDURE — 36415 COLL VENOUS BLD VENIPUNCTURE: CPT

## 2024-02-02 RX ORDER — UBIDECARENONE 100 MG
1 CAPSULE ORAL
Refills: 0 | DISCHARGE

## 2024-02-02 RX ORDER — CHOLECALCIFEROL (VITAMIN D3) 125 MCG
1 CAPSULE ORAL
Refills: 0 | DISCHARGE

## 2024-02-02 RX ORDER — OMEGA-3 ACID ETHYL ESTERS 1 G
1 CAPSULE ORAL
Refills: 0 | DISCHARGE

## 2024-02-02 RX ORDER — PROGESTERONE 200 MG/1
200 CAPSULE, LIQUID FILLED ORAL
Refills: 0 | DISCHARGE

## 2024-02-02 NOTE — H&P PST ADULT - NS SC CAGE ALCOHOL CUT DOWN
HISTORY OF PRESENT ILLNESS  HPI  Guanako Stone is a 73 y.o. Female with history of HTN, DM-II, hyperlipidemia, and vitamin D deficiency who presents to office today for a f/u for her DM, cholesterol, and HTN. She states that she is taking 2000 units of Vitamin D, taking it more normally recently. She adds she did not do so as often as before. Pt says that she does not check her BP at home. Pt affirms that she f/u with her endocrinologist every 3 months. She notes that she had some change to her nighttime medication as it made her feet swell but she cannot remember the name of it. She feels her A1C is at a good level but does not recall that value. Pt remarks that she is taking Ozempic once a week. Pt reports that she checks her glucose twice a day with her most recent reading being 117. She confirms that her glucose doesn't get low often. She recalls her glucose has not been over 200 in a while. Pt denies unusual SOB, chest pain, and any recent ER visits or hospitalizations.          Past Medical History:   Diagnosis Date    Adverse effect of anesthesia     one month after some surgeries had swelling of one lip/tongue/side of face x 1-2 months    Allergic rhinitis     Cancer ([facility] [address]     cancer cells in uterus - surgery    DDD (degenerative disc disease)     thoracic and cervical spine    Diverticulosis     hx diverticulosis    Dyslipidemia, goal LDL below 100 9/29/2017    Eczema     Glaucoma     glaucoma    Hiatal hernia     hiatal hernia    Hyperlipidemia     Hypertension     Ill-defined condition     bulging disc/pinched nerve low back Having surgery [facility] 1/22/19    Ill-defined condition     overweight    Lichen sclerosus     dx at [facility]    ANMOL (obstructive sleep apnea)     CPAP - does not use regularly    Reflux esophagitis     Situational anxiety 9/29/2017    Stomach ulcer     Type II or unspecified type diabetes mellitus without mention of complication, not stated as
uncontrolled      Past Surgical History:   Procedure Laterality Date    COLONOSCOPY N/A 2/27/2018    COLONOSCOPY performed by Lori Shane MD at [address] HX BACK SURGERY      x 3 - total of 3 back surgeries as of 2/26/2018/5 back surgeries as of 6/17/2020    HX COLONOSCOPY  2013    HX DILATION AND CURETTAGE      x 3 for abnormal bleeding    HX GYN      tumors removed from uterus - had cancer cells    HX HEENT  2002    sinus surgery for snoring & partial Uvelectomy    HX HIP REPLACEMENT  2004    rt    HX ORTHOPAEDIC Left 07/2013    hip replacement    HX TONSIL AND ADENOIDECTOMY      pt does not think her adenoids were removed    HX TUBAL LIGATION      PALATOPHAYNGOPLASTY  2002    [facility] DCMPRN PERQ NUCLEUS PULPOSUS 1/> LEVELS LUMBAR  2003    L3-4    TOTAL ABDOM HYSTERECTOMY       Current Outpatient Medications on File Prior to Visit   Medication Sig Dispense Refill    semaglutide (Ozempic) 0.25 mg/0.2 mL (2 mg/1.5 mL) sub-q pen by SubCUTAneous route every seven (7) days.  atorvastatin (LIPITOR) 40 mg tablet Take 1 Tab by mouth daily. 90 Tab 0    mirabegron (MYRBETRIQ PO) Take  by mouth.  insulin aspart (NOVOLOG FLEXPEN U-100 INSULIN SC) 16-20 Units by SubCUTAneous route daily (with dinner).  coenzyme Q-10 (Co Q-10) 200 mg capsule Take 200 mg by mouth daily.  calcium carbonate (TUMS PO) Take 2 Tabs by mouth two (2) times daily as needed.  furosemide (LASIX) 40 mg tablet Take 40 mg by mouth daily as needed.  amLODIPine (NORVASC) 5 mg tablet take 1 tablet by mouth once daily 90 Tab 1    latanoprost (XALATAN) 0.005 % ophthalmic solution Administer 1 Drop to both eyes nightly. 0    CHOLECALCIFEROL, VITAMIN D3, (VITAMIN D3 PO) Take 2,000 Units by mouth daily.  insulin glargine (TOUJEO SOLOSTAR U-300 INSULIN) 300 unit/mL (1.5 mL) inpn 64 Units by SubCUTAneous route nightly.       polyethylene glycol (MIRALAX) 17 gram/dose powder Take 17 g by mouth nightly as
needed.  BD INSULIN PEN NEEDLE UF MINI 31 gauge x 3/16\" ndle use four times a day for INSULIN INJECTIONS as directed  1    insulin aspart (NOVOLOG FLEXPEN) 100 unit/mL inpn 14-20 Units by SubCUTAneous route two (2) times a day. Breakfast and lunch.  ONETOUCH ULTRA TEST strip   0    halobetasol (ULTRAVATE) 0.05 % ointment Apply  to affected area as needed. 0    metFORMIN (GLUCOPHAGE) 500 mg tablet Take 1,000 mg by mouth two (2) times daily (with meals).  [DISCONTINUED] Dexilant 60 mg CpDB capsule (delayed release) TK 1 C PO QD       No current facility-administered medications on file prior to visit. Allergies   Allergen Reactions    Aspirin Nausea and Vomiting     Can take a coated aspirin.  Benicar [Olmesartan] Swelling     Pt states she is not allergic to this.  Ciprocinonide Swelling     Pt. States not allergic to this med.  Ciprofloxacin Swelling     Pt states she is not allergic to this.  Crestor [Rosuvastatin] Myalgia     Pt. Denies allergy .  Famotidine Itching     All other.  Sulfa (Sulfonamide Antibiotics) Hives     Pt. Denies allergy     Family History   Problem Relation Age of Onset    Cancer Maternal Grandfather         lung    Cancer Paternal Grandfather         colon    Heart Attack Mother     Dementia Father         alzheimers    Heart Disease Father     Kidney Disease Father     Dementia Maternal Grandmother         alzheimers    Breast Cancer Paternal Aunt     Other Sister         hysterectomy    Other Brother         shingles    Stroke Sister         mini    Other Nephew         balance disorder     Social History     Socioeconomic History    Marital status: SINGLE     Spouse name: Not on file    Number of children: Not on file    Years of education: Not on file    Highest education level: Not on file   Tobacco Use    Smoking status: Never Smoker    Smokeless tobacco: Never Used   Substance and Sexual Activity    Alcohol use:  Yes
Frequency: Monthly or less     Drinks per session: 1 or 2     Binge frequency: Never     Comment: occasional    Drug use: No    Sexual activity: Not Currently   Other Topics Concern     Service No    Blood Transfusions No    Caffeine Concern No    Occupational Exposure No    Hobby Hazards No    Sleep Concern Yes    Stress Concern Yes    Weight Concern Yes    Special Diet No    Back Care Yes     Comment: 3 back surgeries    Exercise No    Seat Belt Yes    Self-Exams Yes   Social History Narrative    Lives at home with brother. Review of Systems   Constitutional: Negative for chills, diaphoresis, fever, malaise/fatigue and weight loss. Eyes: Negative for blurred vision, double vision, pain and redness. Respiratory: Negative for cough, shortness of breath and wheezing. Cardiovascular: Negative for chest pain, palpitations, orthopnea, claudication, leg swelling and PND. Genitourinary: Negative for frequency. Skin: Negative for itching and rash. Neurological: Negative for dizziness, tingling, tremors, sensory change, speech change, focal weakness, seizures, loss of consciousness, weakness and headaches. Endo/Heme/Allergies: Negative for environmental allergies and polydipsia. Does not bruise/bleed easily. Results for orders placed or performed during the hospital encounter of 07/28/20   NOVEL CORONAVIRUS (COVID-19)   Result Value Ref Range    SARS-CoV-2 Not Detected Not Detected           Physical Exam  Visit Vitals  /66 (BP 1 Location: Right arm, BP Patient Position: Sitting)   Pulse 96   Temp 97.6 °F (36.4 °C) (Oral)   Resp 16   Ht 5' 6\" (1.676 m)   Wt 223 lb (101.2 kg)   LMP 09/23/2009 Comment: [place] spotting   SpO2 97%   BMI 35.99 kg/m²         Head: Normocephalic, without obvious abnormality, atraumatic  Eyes: conjunctivae/corneas clear. PERRL, EOM's intact.    Neck: supple, symmetrical, trachea midline, no adenopathy, thyroid: not enlarged, symmetric,
no tenderness/mass/nodules, no carotid bruit and no JVD  Lungs: clear to auscultation bilaterally  Heart: regular rate and rhythm, S1, S2 normal, no murmur, click, rub or gallop  Extremities: extremities normal, atraumatic, no cyanosis or edema  Pulses: 2+ and symmetric  Lymph nodes: Cervical, supraclavicular, and axillary nodes normal.  Neurologic: Grossly normal          ASSESSMENT and PLAN    ICD-10-CM ICD-9-CM    1. Essential hypertension  U73 101.0 METABOLIC PANEL, COMPREHENSIVE      CBC W/O DIFF   2. Controlled type 2 diabetes mellitus without complication, with long-term current use of insulin (HCC)  E11.9 250.00 LIPID PANEL    Z79.4 V58.67    3. Vitamin D deficiency  E55.9 268.9 VITAMIN D, 25 HYDROXY      DEXA BONE DENSITY STUDY AXIAL   4. Dyslipidemia, goal LDL below 100  E78.5 272.4 LIPID PANEL   5. Menopause  Z78.0 627.2 DEXA BONE DENSITY STUDY AXIAL     Diagnoses and all orders for this visit:    1. Essential hypertension  -     METABOLIC PANEL, COMPREHENSIVE  -     CBC W/O DIFF    2. Controlled type 2 diabetes mellitus without complication, with long-term current use of insulin (HCC)  -     LIPID PANEL    3. Vitamin D deficiency  -     VITAMIN D, 25 HYDROXY  -     DEXA BONE DENSITY STUDY AXIAL; Future    4. Dyslipidemia, goal LDL below 100  -     LIPID PANEL    5. Menopause  -     DEXA BONE DENSITY STUDY AXIAL; Future      Follow-up and Dispositions    · Return for F/U HTN,CHOL,DM, F/U of obesity, f/u Vitamin D deficiency, GERD, allergic rhinitis. lab results and schedule of future lab studies reviewed with patient  reviewed diet, exercise and weight control  cardiovascular risk and specific lipid/LDL goals reviewed  reviewed medications and side effects in detail  Please call my office if there are any questions- [phone]. I will arrange for follow up after the lab tests done from today return  Recommended a weekly \"heart check. \" I went into detail how to do this.   Call for refills on
medications as needed. Discussed expected course/resolution/complications of diagnosis in detail with patient. Medication risks/benefits/costs/interactions/alternatives discussed with patient. Pt was given an after visit summary which includes diagnoses, current medications & vitals. Pt expressed understanding with the diagnosis and plan        BMI is significantly elevated- in the obese range. I reviewed diet, exercise and weight control. Discussed weight control in detail, the importance of mainly decreased carbs, and for weight maintenance, exercise; discussed different diets and that it isn't as important to watch the type of foods as it is to decrease calorie intake no matter what type of diet you do, etc.     Total 45 minutes,60 % counseling re: Recommended a weekly \"heart check. \" I went into detail how to do this. Regular exercise is very important to your health; it helps mentally, physically, socially; it prevents injuries if done properly. Exercise, even as simple as walking 20-30 minutes daily has major benefits to your health even though your \"numbers\" are the same in the lab. See if you can add this into your daily regimen and after a few months it will become a regular habit-\"just something you do,\" like brushing your teeth. A combination of aerobic exercise and strengthening and stretching is felt to be the best for you, so this should be your ultimate goal.   This can be done in the privacy of your home or in a group setting as at the gym  Some prefer having a , others prefer to do exercise in groups or individually. Do what \"works\" for you. You need to make it simple and \"fun,\" or you most likely will not continue it. Reviewed symptoms, or lack thereof, of hypertension and elevated cholesterol.      Review of  the proper technique of checking the blood pressure- check it on an average day only, not on a stressful day, sitting, no exercise for at least 1 hour and not
experiencing any new pain( chronic pain is OK). Patient encouraged to check BP sitting and standing at least once a month and to report these readings to me if > 140/ 90 on average , or if the standing BP is >  15 points lower than the sitting. Always check it twice and if there is > 5 points decrease from the previous reading( top reading or systolic) keep checking it until it does not drop 5 points. Write only this final reading down, not the preceding readings. If out of these readings there is only 1 out of 4 or less > 423, or > 90 diastolic then your blood pressure is OK; it needs further treatment if it is above this. Also, don't forget,  as noted above, to check your blood pressure standing once a month; this is to detect a drop in your BP that might lead to fainting and serious injury; you check it standing with your arm hanging straight down and relaxed. Check it twice waiting 1 minute between the two readings. If, with either one of these 2 readings there is a > 15 point drop of the systolic compared to your sitting pressure( done before the standing BP), then let me know. Following these guidelines, continue to check your BP and write down only the ones described above and it will help me to effectively treat your blood pressure. Reviewed BP, cholesterol and diabetic goals. LDL goal<100,HDL goal>45, Triglyceride goal<150, A1C< 7.0, BP<140/90. Discussed specific diabetic recommendations: low cholesterol diet, weight control and daily exercise discussed, home glucose monitoring emphasized, all medications, side effects and compliance discussed carefully, foot care discussed and Podiatry visits discussed, annual eye examinations at Ophthalmology discussed, glycohemoglobin and other lab monitoring discussed and long term diabetic complications discussed.   Also, discussed symptoms of concern that were noted today in the note above, treatment options( including doing nothing), when to follow up
before recommended time frame. Also, answered all questions. Pt is being seen by endocrinologist. We will notify her about her cholesterol and vitamin D level. The visit today was brought on by a refill of her cholesterol medicine. We noted that she had not been in for a while to do any lab work. We are getting copies of her lab work from her endocrinologist most of the time. The latest ones in the chart are from May of this year. A1C was 7.7 in Feb but increased to 9.0 in May of this year. Continue to monitor the DM and call if glucoses are above 200 consistently. She is due an AWV and will do that in November. Discussed her need to keep her immunizations UTD, to continue to work n weight loss, continue a weekly heart check. This document was written by Merlin Skillern, as dictated by Caitlin Rothman MD.  I have reviewed and agree with the above note and have made corrections where appropriate Henry Cameron M.D.
no
upper back

## 2024-02-02 NOTE — H&P PST ADULT - ASSESSMENT
29yo F patient of Dr. Rea , LMP 2024, Pmhx ovarian cyst and pelvic/ perineal pain, presents for PST. Hx chronic pelvic and perineal pain described as burning, hx vestibulodynia urologic symptoms and cervical motion tenderness, mgmt has included pelvic PT, ETA topical cream, and s/p trigger point Injection with 300 units of Botox, pudendal nerve Block with Dr. Rea on 2023 and 2022 without relief. Last pap - Normal. She c/w burning and discomfort with intercourse. Denies any fever, chills, N/V or urine frequency/ hematuria or other UTI symptoms, and o/w reports feeling well today. She is scheduled 2024 for trigger point injection with 300units botox, pudendal nerve block with Dr. Rea.     CAPRINI SCORE    AGE RELATED RISK FACTORS                                                             [ ] Age 41-60 years                                            (1 Point)  [ ] Age: 61-74 years                                           (2 Points)                 [ ] Age= 75 years                                                (3 Points)             DISEASE RELATED RISK FACTORS                                                       [ ] Edema in the lower extremities                 (1 Point)                     [ ] Varicose veins                                               (1 Point)                                 [ ] BMI > 25 Kg/m2                                            (1 Point)                                  [ ] Serious infection (ie PNA)                            (1 Point)                     [ ] Lung disease ( COPD, Emphysema)            (1 Point)                                                                          [ ] Acute myocardial infarction                         (1 Point)                  [ ] Congestive heart failure (in the previous month)  (1 Point)         [ ] Inflammatory bowel disease                            (1 Point)                  [ ] Central venous access, PICC or Port               (2 points)       (within the last month)                                                                [ ] Stroke (in the previous month)                        (5 Points)    [ ] Previous or present malignancy                       (2 points)                                                                                                                                                         HEMATOLOGY RELATED FACTORS                                                         [ ] Prior episodes of VTE                                     (3 Points)                     [ ] Positive family history for VTE                      (3 Points)                  [ ] Prothrombin 75611 A                                     (3 Points)                     [ ] Factor V Leiden                                                (3 Points)                        [ ] Lupus anticoagulants                                      (3 Points)                                                           [ ] Anticardiolipin antibodies                              (3 Points)                                                       [ ] High homocysteine in the blood                   (3 Points)                                             [ ] Other congenital or acquired thrombophilia      (3 Points)                                                [ ] Heparin induced thrombocytopenia                  (3 Points)                                        MOBILITY RELATED FACTORS  [ ] Bed rest                                                         (1 Point)  [ ] Plaster cast                                                    (2 points)  [ ] Bed bound for more than 72 hours           (2 Points)    GENDER SPECIFIC FACTORS  [ ] Pregnancy or had a baby within the last month   (1 Point)  [ ] Post-partum < 6 weeks                                   (1 Point)  [ x] Hormonal therapy  or oral contraception   (1 Point)  [ ] History of pregnancy complications              (1 point)  [ ] Unexplained or recurrent              (1 Point)    OTHER RISK FACTORS                                           (1 Point)  [ ] BMI >40, smoking, diabetes requiring insulin, chemotherapy  blood transfusions and length of surgery over 2 hours    SURGERY RELATED RISK FACTORS  [ ]  Section within the last month     (1 Point)  [ x] Minor surgery                                                  (1 Point)  [ ] Arthroscopic surgery                                       (2 Points)  [ ] Planned major surgery lasting more            (2 Points)      than 45 minutes     [ ] Elective hip or knee joint replacement       (5 points)       surgery                                                TRAUMA RELATED RISK FACTORS  [ ] Fracture of the hip, pelvis, or leg                       (5 Points)  [ ] Spinal cord injury resulting in paralysis             (5 points)       (in the previous month)    [ ] Paralysis  (less than 1 month)                             (5 Points)  [ ] Multiple Trauma within 1 month                        (5 Points)    Total Score [  2      ]    Caprini Score 0-2: Low Risk, NO VTE prophylaxis required for most patients, encourage ambulation  Caprini Score 3-6: Moderate Risk , pharmacologic VTE prophylaxis is indicated for most patients (in the absence of contraindications)  Caprini Score Greater than or =7: High risk, pharmocologic VTE prophylaxis indicated for most patients (in the absence of contraindications)                        OPIOID RISK TOOL    JOSELYN EACH BOX THAT APPLIES AND ADD TOTALS AT THE END    FAMILY HISTORY OF SUBSTANCE ABUSE                 FEMALE         MALE                                                Alcohol                             [  ]1 pt          [  ]3pts                                               Illegal Durgs                     [  ]2 pts        [  ]3pts                                               Rx Drugs                           [  ]4 pts        [  ]4 pts    PERSONAL HISTORY OF SUBSTANCE ABUSE                                                                                          Alcohol                             [  ]3 pts       [  ]3 pts                                               Illegal Drugs                     [  ]4 pts        [  ]4 pts                                               Rx Drugs                           [  ]5 pts        [  ]5 pts    AGE BETWEEN 16-45 YEARS                                      [ x ]1 pt         [  ]1 pt    HISTORY OF PREADOLESCENT   SEXUAL ABUSE                                                             [  ]3 pts        [  ]0pts    PSYCHOLOGICAL DISEASE                     ADD, OCD, Bipolar, Schizophrenia        [  ]2 pts         [  ]2 pts                      Depression                                               [  ]1 pt           [  ]1 pt           SCORING TOTAL   (add numbers and type here)              (1)                                     A score of 3 or lower indicated LOW risk for future opioid abuse  A score of 4 to 7 indicated moderate risk for future opioid abuse  A score of 8 or higher indicates a high risk for opioid abuse

## 2024-02-02 NOTE — H&P PST ADULT - NEUROLOGICAL
sensation intact/responds to pain/deep reflexes intact/no spontaneous movement negative normal/sensation intact/responds to pain/deep reflexes intact/cranial nerves intact/no spontaneous movement

## 2024-02-02 NOTE — H&P PST ADULT - HISTORY OF PRESENT ILLNESS
pelvic pain.  ?  History of Present Illness       Charlene had a B/L pudendal nerve block and BTX injection into her pelvic floor muscles in November. She reports significant reduction in her burning for about 6 months. It started back up in April and she has been using compounding cream but wants to do another injection.  ?  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  Current Meds  Cymbalta CPEP  Omeprazole 10 MG Oral Capsule Delayed Release       ?  Allergies  No Known Drug Allergies       Pelvic floor dysfunction (618.83) (M62.89)  Dyspareunia, female (625.0) (N94.10)       Plan  Microscopic hematuria  Post Void Residual (PVR); Status:Complete;   Done: [id number] 08:39AM       ?  Discussion/Summary       We discussed the R/B/A nerve block and trigger point injections. I mapped her today and would inject 5mls of BTX into the perineal body and then 3 mls on left levator and OI and 2 mls on right OI and levator. Full informed consent was obtained. Risk of exacerbation of pain, not working as well as it has in the past, bleeding all discussed.     juanito is a 28 year old  female ,  LMP 2023, presenting today for PST, PMH includes ovarian cyst. Patient c/o longstanding history of pelvic and perineal pain. 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 She had pelvic PT for few months without any relief. Denies any UTI symptoms. Last pap - Normal. she had a pudendal nerve block and trigger point injection with Botox 2022 with relief. States in late April symptoms returned.  She reports burning and discomfort with intercourse. Now she is scheduled for a Trigger point Injection with 300 units of Botox, Pudendal nerve Block with  Dr. Rea on 2023. She was previously scheduled ot have procedure on 2023 but due to insurance changes procedure was postponed.    29yo F patient of Dr. Rea ,  LMP ..., Pmhx CKD? constipation? ovarian cyst and pelvic/ perineal pain, presents for PST. Hx chronic pelvic and perineal pain described as ... , hx vestibulodynia urologic symptoms and cervical motion tenderness, mgmt has included pelvic PT, ETA topical cream, and s/p trigger point Injection with 300 units of Botox, pudendal nerve Block with Dr. Rea on 2023 and 2022 without any relief. Last pap - Normal. She c/w burning and discomfort with intercourse. Denies any fever, chills, N/V or urine frequency/ hematuria or other UTI symptoms, and o/w reports feeling well today.          ?  Current Meds  Cymbalta CPEP  Omeprazole 10 MG Oral Capsule Delayed Release                 27yo F patient of Dr. Rea , LMP 2024, Pmhx ovarian cyst and pelvic/ perineal pain, presents for PST. Hx chronic pelvic and perineal pain described as burning, hx vestibulodynia urologic symptoms and cervical motion tenderness, mgmt has included pelvic PT, ETA topical cream, and s/p trigger point Injection with 300 units of Botox, pudendal nerve Block with Dr. Rea on 2023 and 2022 without relief. Last pap - Normal. She c/w burning and discomfort with intercourse. Denies any fever, chills, N/V or urine frequency/ hematuria or other UTI symptoms, and o/w reports feeling well today. She is scheduled 2024 for trigger point injection with 300units botox, pudendal nerve block with Dr. Rea.

## 2024-02-02 NOTE — H&P PST ADULT - PROBLEM SELECTOR PLAN 2
cap 2  Low Risk,  SCDs ordered for day of procedure.  Surgical team to assess need for VTE Prophylaxis

## 2024-02-02 NOTE — H&P PST ADULT - NEGATIVE GENERAL GENITOURINARY SYMPTOMS
no hematuria/no renal colic/no flank pain L/no flank pain R/no dysuria no hematuria/no renal colic/no flank pain L/no flank pain R/no dysuria/normal urinary frequency

## 2024-02-02 NOTE — H&P PST ADULT - NSALCOHOLTYPE_GEN__A_CORE_SD
Encounter details require adjustment(s)/ updating by [facility] Staff  As of this time Protocols: did not populate or display   Adjustment(s) made: Department  CDM should display. Medication(s) delegated by the OR.  Will resend refill request encounter to P Centralized Refill Staff Pool.   [facility]   Note composed:5:02 PM 02/26/2022           wine

## 2024-02-02 NOTE — H&P PST ADULT - NSICDXFAMILYHX_GEN_ALL_CORE_FT
FAMILY HISTORY:  Father  Still living? Unknown  Family history of high cholesterol, Age at diagnosis: Age Unknown  FH: HTN (hypertension), Age at diagnosis: Age Unknown    Mother  Still living? Unknown  FH: thyroid disease, Age at diagnosis: Age Unknown

## 2024-02-02 NOTE — H&P PST ADULT - PROBLEM SELECTOR PLAN 1
scheduled 02/07/2024 for trigger point injection with 300units botox, pudendal nerve block with Dr. Rea.   Patient educated on surgical scrub, ERAS, preadmission instructions, and day of procedure medications, verbalizes understanding, teach back method utilized.   stop vitamins 7 days prior to procedure  Hold oral medication day or procedure

## 2024-02-02 NOTE — H&P PST ADULT - GASTROINTESTINAL
normal/soft/nontender/nondistended/normal active bowel sounds details… normal/soft/nontender/nondistended/normal active bowel sounds/no palpable ailyn

## 2024-02-04 LAB
CULTURE RESULTS: SIGNIFICANT CHANGE UP
SPECIMEN SOURCE: SIGNIFICANT CHANGE UP

## 2024-02-05 ENCOUNTER — APPOINTMENT (OUTPATIENT)
Dept: UROGYNECOLOGY | Facility: CLINIC | Age: 29
End: 2024-02-05

## 2024-02-07 ENCOUNTER — APPOINTMENT (OUTPATIENT)
Dept: UROGYNECOLOGY | Facility: CLINIC | Age: 29
End: 2024-02-07

## 2024-02-07 ENCOUNTER — APPOINTMENT (OUTPATIENT)
Dept: UROGYNECOLOGY | Facility: HOSPITAL | Age: 29
End: 2024-02-07

## 2024-02-19 ENCOUNTER — APPOINTMENT (OUTPATIENT)
Dept: UROGYNECOLOGY | Facility: CLINIC | Age: 29
End: 2024-02-19

## 2024-02-21 ENCOUNTER — APPOINTMENT (OUTPATIENT)
Dept: UROGYNECOLOGY | Facility: CLINIC | Age: 29
End: 2024-02-21

## 2024-04-25 ENCOUNTER — APPOINTMENT (OUTPATIENT)
Dept: UROGYNECOLOGY | Facility: CLINIC | Age: 29
End: 2024-04-25

## 2024-05-14 ENCOUNTER — APPOINTMENT (OUTPATIENT)
Dept: UROGYNECOLOGY | Facility: CLINIC | Age: 29
End: 2024-05-14

## 2024-09-30 ENCOUNTER — APPOINTMENT (OUTPATIENT)
Dept: UROLOGY | Facility: CLINIC | Age: 29
End: 2024-09-30

## 2025-03-11 ENCOUNTER — APPOINTMENT (OUTPATIENT)
Dept: UROGYNECOLOGY | Facility: CLINIC | Age: 30
End: 2025-03-11

## 2025-03-11 VITALS
SYSTOLIC BLOOD PRESSURE: 110 MMHG | RESPIRATION RATE: 14 BRPM | DIASTOLIC BLOOD PRESSURE: 72 MMHG | HEIGHT: 66 IN | HEART RATE: 76 BPM | WEIGHT: 131 LBS | BODY MASS INDEX: 21.05 KG/M2

## 2025-03-11 DIAGNOSIS — M62.89 OTHER SPECIFIED DISORDERS OF MUSCLE: ICD-10-CM

## 2025-03-11 DIAGNOSIS — M79.18 MYALGIA, OTHER SITE: ICD-10-CM

## 2025-03-11 LAB
BILIRUB UR QL STRIP: NEGATIVE
CLARITY UR: NORMAL
COLLECTION METHOD: NORMAL
GLUCOSE UR-MCNC: NEGATIVE
HCG UR QL: 0.2 EU/DL
HGB UR QL STRIP.AUTO: NORMAL
KETONES UR-MCNC: NORMAL
LEUKOCYTE ESTERASE UR QL STRIP: NEGATIVE
NITRITE UR QL STRIP: NEGATIVE
PH UR STRIP: 5.5
PROT UR STRIP-MCNC: 100
SP GR UR STRIP: 1.03

## 2025-03-11 PROCEDURE — 99213 OFFICE O/P EST LOW 20 MIN: CPT

## 2025-03-11 PROCEDURE — 51798 US URINE CAPACITY MEASURE: CPT

## 2025-03-11 PROCEDURE — 81003 URINALYSIS AUTO W/O SCOPE: CPT | Mod: QW

## 2025-03-11 PROCEDURE — 99459 PELVIC EXAMINATION: CPT

## 2025-03-11 RX ORDER — DIAZEPAM 100 %
POWDER (GRAM) MISCELLANEOUS
Qty: 30 | Refills: 0 | Status: ACTIVE | COMMUNITY
Start: 2025-03-11 | End: 1900-01-01

## 2025-05-03 ENCOUNTER — OUTPATIENT (OUTPATIENT)
Dept: OUTPATIENT SERVICES | Facility: HOSPITAL | Age: 30
LOS: 1 days | End: 2025-05-03
Payer: COMMERCIAL

## 2025-05-03 VITALS
RESPIRATION RATE: 18 BRPM | DIASTOLIC BLOOD PRESSURE: 69 MMHG | HEART RATE: 76 BPM | WEIGHT: 136.69 LBS | SYSTOLIC BLOOD PRESSURE: 116 MMHG | HEIGHT: 66 IN | TEMPERATURE: 95 F | OXYGEN SATURATION: 99 %

## 2025-05-03 DIAGNOSIS — Z29.9 ENCOUNTER FOR PROPHYLACTIC MEASURES, UNSPECIFIED: ICD-10-CM

## 2025-05-03 DIAGNOSIS — R10.2 PELVIC AND PERINEAL PAIN: ICD-10-CM

## 2025-05-03 DIAGNOSIS — Z87.42 PERSONAL HISTORY OF OTHER DISEASES OF THE FEMALE GENITAL TRACT: Chronic | ICD-10-CM

## 2025-05-03 DIAGNOSIS — Z01.818 ENCOUNTER FOR OTHER PREPROCEDURAL EXAMINATION: ICD-10-CM

## 2025-05-03 DIAGNOSIS — M79.18 MYALGIA, OTHER SITE: ICD-10-CM

## 2025-05-03 LAB
A1C WITH ESTIMATED AVERAGE GLUCOSE RESULT: 5.3 % — SIGNIFICANT CHANGE UP (ref 4–5.6)
ANION GAP SERPL CALC-SCNC: 16 MMOL/L — SIGNIFICANT CHANGE UP (ref 5–17)
APPEARANCE UR: CLEAR — SIGNIFICANT CHANGE UP
APTT BLD: 33.6 SEC — SIGNIFICANT CHANGE UP (ref 26.1–36.8)
BASOPHILS # BLD AUTO: 0.06 K/UL — SIGNIFICANT CHANGE UP (ref 0–0.2)
BASOPHILS NFR BLD AUTO: 0.9 % — SIGNIFICANT CHANGE UP (ref 0–2)
BILIRUB UR-MCNC: NEGATIVE — SIGNIFICANT CHANGE UP
BUN SERPL-MCNC: 7.3 MG/DL — LOW (ref 8–20)
CALCIUM SERPL-MCNC: 9.2 MG/DL — SIGNIFICANT CHANGE UP (ref 8.4–10.5)
CHLORIDE SERPL-SCNC: 98 MMOL/L — SIGNIFICANT CHANGE UP (ref 96–108)
CO2 SERPL-SCNC: 21 MMOL/L — LOW (ref 22–29)
COLOR SPEC: YELLOW — SIGNIFICANT CHANGE UP
CREAT SERPL-MCNC: 0.59 MG/DL — SIGNIFICANT CHANGE UP (ref 0.5–1.3)
DIFF PNL FLD: NEGATIVE — SIGNIFICANT CHANGE UP
EGFR: 126 ML/MIN/1.73M2 — SIGNIFICANT CHANGE UP
EGFR: 126 ML/MIN/1.73M2 — SIGNIFICANT CHANGE UP
EOSINOPHIL # BLD AUTO: 0.12 K/UL — SIGNIFICANT CHANGE UP (ref 0–0.5)
EOSINOPHIL NFR BLD AUTO: 1.9 % — SIGNIFICANT CHANGE UP (ref 0–6)
ESTIMATED AVERAGE GLUCOSE: 105 MG/DL — SIGNIFICANT CHANGE UP (ref 68–114)
GLUCOSE SERPL-MCNC: 81 MG/DL — SIGNIFICANT CHANGE UP (ref 70–99)
GLUCOSE UR QL: NEGATIVE MG/DL — SIGNIFICANT CHANGE UP
HCT VFR BLD CALC: 40.9 % — SIGNIFICANT CHANGE UP (ref 34.5–45)
HGB BLD-MCNC: 13.5 G/DL — SIGNIFICANT CHANGE UP (ref 11.5–15.5)
IMM GRANULOCYTES # BLD AUTO: 0.01 K/UL — SIGNIFICANT CHANGE UP (ref 0–0.07)
IMM GRANULOCYTES NFR BLD AUTO: 0.2 % — SIGNIFICANT CHANGE UP (ref 0–0.9)
INR BLD: 1.05 RATIO — SIGNIFICANT CHANGE UP (ref 0.85–1.16)
KETONES UR-MCNC: 15 MG/DL
LEUKOCYTE ESTERASE UR-ACNC: NEGATIVE — SIGNIFICANT CHANGE UP
LYMPHOCYTES # BLD AUTO: 1.55 K/UL — SIGNIFICANT CHANGE UP (ref 1–3.3)
LYMPHOCYTES NFR BLD AUTO: 24.2 % — SIGNIFICANT CHANGE UP (ref 13–44)
MCHC RBC-ENTMCNC: 28.2 PG — SIGNIFICANT CHANGE UP (ref 27–34)
MCHC RBC-ENTMCNC: 33 G/DL — SIGNIFICANT CHANGE UP (ref 32–36)
MCV RBC AUTO: 85.4 FL — SIGNIFICANT CHANGE UP (ref 80–100)
MONOCYTES # BLD AUTO: 0.39 K/UL — SIGNIFICANT CHANGE UP (ref 0–0.9)
MONOCYTES NFR BLD AUTO: 6.1 % — SIGNIFICANT CHANGE UP (ref 2–14)
NEUTROPHILS # BLD AUTO: 4.27 K/UL — SIGNIFICANT CHANGE UP (ref 1.8–7.4)
NEUTROPHILS NFR BLD AUTO: 66.7 % — SIGNIFICANT CHANGE UP (ref 43–77)
NITRITE UR-MCNC: NEGATIVE — SIGNIFICANT CHANGE UP
NRBC # BLD AUTO: 0 K/UL — SIGNIFICANT CHANGE UP (ref 0–0)
NRBC # FLD: 0 K/UL — SIGNIFICANT CHANGE UP (ref 0–0)
NRBC BLD AUTO-RTO: 0 /100 WBCS — SIGNIFICANT CHANGE UP (ref 0–0)
PH UR: 7 — SIGNIFICANT CHANGE UP (ref 5–8)
PLATELET # BLD AUTO: 265 K/UL — SIGNIFICANT CHANGE UP (ref 150–400)
PMV BLD: 12.6 FL — SIGNIFICANT CHANGE UP (ref 7–13)
POTASSIUM SERPL-MCNC: 3.8 MMOL/L — SIGNIFICANT CHANGE UP (ref 3.5–5.3)
POTASSIUM SERPL-SCNC: 3.8 MMOL/L — SIGNIFICANT CHANGE UP (ref 3.5–5.3)
PROT UR-MCNC: NEGATIVE MG/DL — SIGNIFICANT CHANGE UP
PROTHROM AB SERPL-ACNC: 12.2 SEC — SIGNIFICANT CHANGE UP (ref 9.9–13.4)
RBC # BLD: 4.79 M/UL — SIGNIFICANT CHANGE UP (ref 3.8–5.2)
RBC # FLD: 12.7 % — SIGNIFICANT CHANGE UP (ref 10.3–14.5)
SODIUM SERPL-SCNC: 135 MMOL/L — SIGNIFICANT CHANGE UP (ref 135–145)
SP GR SPEC: 1.01 — SIGNIFICANT CHANGE UP (ref 1–1.03)
UROBILINOGEN FLD QL: 0.2 MG/DL — SIGNIFICANT CHANGE UP (ref 0.2–1)
WBC # BLD: 6.4 K/UL — SIGNIFICANT CHANGE UP (ref 3.8–10.5)
WBC # FLD AUTO: 6.4 K/UL — SIGNIFICANT CHANGE UP (ref 3.8–10.5)

## 2025-05-03 PROCEDURE — 85610 PROTHROMBIN TIME: CPT

## 2025-05-03 PROCEDURE — 36415 COLL VENOUS BLD VENIPUNCTURE: CPT

## 2025-05-03 PROCEDURE — 85730 THROMBOPLASTIN TIME PARTIAL: CPT

## 2025-05-03 PROCEDURE — 85025 COMPLETE CBC W/AUTO DIFF WBC: CPT

## 2025-05-03 PROCEDURE — G0463: CPT

## 2025-05-03 PROCEDURE — 81003 URINALYSIS AUTO W/O SCOPE: CPT

## 2025-05-03 PROCEDURE — 80048 BASIC METABOLIC PNL TOTAL CA: CPT

## 2025-05-03 PROCEDURE — 87086 URINE CULTURE/COLONY COUNT: CPT

## 2025-05-03 PROCEDURE — 83036 HEMOGLOBIN GLYCOSYLATED A1C: CPT

## 2025-05-03 RX ORDER — CELECOXIB 50 MG/1
200 CAPSULE ORAL ONCE
Refills: 0 | Status: DISCONTINUED | OUTPATIENT
Start: 2025-05-14 | End: 2025-05-28

## 2025-05-03 RX ORDER — ESTRADIOL 25 MCG
0.1 TABLET VAGINAL
Refills: 0 | DISCHARGE

## 2025-05-03 RX ORDER — OMEGA-3-ACID ETHYL ESTERS CAPSULES 1 G/1
1 CAPSULE, LIQUID FILLED ORAL
Refills: 0 | DISCHARGE

## 2025-05-03 RX ORDER — ACETAMINOPHEN 500 MG/5ML
975 LIQUID (ML) ORAL ONCE
Refills: 0 | Status: DISCONTINUED | OUTPATIENT
Start: 2025-05-14 | End: 2025-05-28

## 2025-05-03 RX ORDER — ESTRADIOL 0.05MG/24H
1 PATCH, TRANSDERMAL SEMIWEEKLY TRANSDERMAL
Refills: 0 | DISCHARGE

## 2025-05-03 RX ORDER — OMEPRAZOLE 20 MG/1
1 CAPSULE, DELAYED RELEASE ORAL
Refills: 0 | DISCHARGE

## 2025-05-04 LAB
CULTURE RESULTS: SIGNIFICANT CHANGE UP
SPECIMEN SOURCE: SIGNIFICANT CHANGE UP

## 2025-05-13 NOTE — ASU PATIENT PROFILE, ADULT - NSICDXPASTMEDICALHX_GEN_ALL_CORE_FT
PAST MEDICAL HISTORY:  Gastritis     History of ovarian cyst     Need for prophylactic measure     Pelvic and perineal pain

## 2025-05-14 ENCOUNTER — APPOINTMENT (OUTPATIENT)
Dept: UROGYNECOLOGY | Facility: HOSPITAL | Age: 30
End: 2025-05-14

## 2025-05-14 ENCOUNTER — TRANSCRIPTION ENCOUNTER (OUTPATIENT)
Age: 30
End: 2025-05-14

## 2025-05-14 ENCOUNTER — OUTPATIENT (OUTPATIENT)
Dept: INPATIENT UNIT | Facility: HOSPITAL | Age: 30
LOS: 1 days | End: 2025-05-14
Payer: COMMERCIAL

## 2025-05-14 VITALS
DIASTOLIC BLOOD PRESSURE: 72 MMHG | HEART RATE: 60 BPM | OXYGEN SATURATION: 100 % | TEMPERATURE: 98 F | RESPIRATION RATE: 20 BRPM | SYSTOLIC BLOOD PRESSURE: 107 MMHG

## 2025-05-14 VITALS
DIASTOLIC BLOOD PRESSURE: 71 MMHG | HEART RATE: 72 BPM | WEIGHT: 136.69 LBS | RESPIRATION RATE: 16 BRPM | SYSTOLIC BLOOD PRESSURE: 120 MMHG | OXYGEN SATURATION: 100 % | HEIGHT: 66 IN | TEMPERATURE: 98 F

## 2025-05-14 DIAGNOSIS — R10.2 PELVIC AND PERINEAL PAIN: ICD-10-CM

## 2025-05-14 DIAGNOSIS — M79.18 MYALGIA, OTHER SITE: ICD-10-CM

## 2025-05-14 DIAGNOSIS — Z87.42 PERSONAL HISTORY OF OTHER DISEASES OF THE FEMALE GENITAL TRACT: Chronic | ICD-10-CM

## 2025-05-14 PROCEDURE — 20553 NJX 1/MLT TRIGGER POINTS 3/>: CPT

## 2025-05-14 PROCEDURE — 64430 NJX AA&/STRD PUDENDAL NERVE: CPT | Mod: 50,59

## 2025-05-14 RX ORDER — SODIUM CHLORIDE 9 G/1000ML
1000 INJECTION, SOLUTION INTRAVENOUS
Refills: 0 | Status: DISCONTINUED | OUTPATIENT
Start: 2025-05-14 | End: 2025-05-14

## 2025-05-14 RX ORDER — FENTANYL CITRATE-0.9 % NACL/PF 100MCG/2ML
25 SYRINGE (ML) INTRAVENOUS
Refills: 0 | Status: DISCONTINUED | OUTPATIENT
Start: 2025-05-14 | End: 2025-05-14

## 2025-05-14 RX ORDER — FENTANYL CITRATE-0.9 % NACL/PF 100MCG/2ML
50 SYRINGE (ML) INTRAVENOUS
Refills: 0 | Status: DISCONTINUED | OUTPATIENT
Start: 2025-05-14 | End: 2025-05-14

## 2025-05-14 RX ORDER — ONABOTULINUMTOXINA 50 [USP'U]/1
300 INJECTION, POWDER, LYOPHILIZED, FOR SOLUTION INTRAMUSCULAR ONCE
Refills: 0 | Status: DISCONTINUED | OUTPATIENT
Start: 2025-05-14 | End: 2025-05-28

## 2025-05-14 RX ORDER — ONDANSETRON HCL/PF 4 MG/2 ML
4 VIAL (ML) INJECTION ONCE
Refills: 0 | Status: DISCONTINUED | OUTPATIENT
Start: 2025-05-14 | End: 2025-05-14

## 2025-05-14 NOTE — ASU PREOP CHECKLIST - BMI (KG/M2)
22.1
Elio Almaraz  GASTROENTEROLOGY  [address] [place] [id number]  Phone: [phone]  Fax: [phone]  Follow Up Time:

## 2025-05-14 NOTE — ASU DISCHARGE PLAN (ADULT/PEDIATRIC) - NOTHING PER VAGINA DURATION
From: Karey Davis  To: Angel Luis Dorsey MD  Sent: 1/25/2018 12:43 PM CST  Subject: Referal    Thank you for referring me for a colonoscopy. [place] left me a message with the name and phone number of the physician you are recommending and I would like to look him up on line to learn more about him. Can you please provide the spelling of his name? I can't seem to find him phonetically.    Thanks so much!    Karey   2 weeks

## 2025-05-14 NOTE — ASU DISCHARGE PLAN (ADULT/PEDIATRIC) - FINANCIAL ASSISTANCE
[facility] provides services at a reduced cost to those who are determined to be eligible through [facility]’s financial assistance program. Information regarding [facility]’s financial assistance program can be found by going to [email or web address] or by calling [phone].

## 2025-05-14 NOTE — BRIEF OPERATIVE NOTE - OPERATION/FINDINGS
Bilateral pudendal nerve blocked performed. 300U botox trigger point injections performed in vaginal and perineal region.

## 2025-05-14 NOTE — ASU PREOP CHECKLIST - DENTURES
Noted with elevated AST and ALT with AST > ALT and elevated bili.  Suspect alcohol induced  Alcohol cessation  Monitor CMP   no

## 2025-05-14 NOTE — BRIEF OPERATIVE NOTE - NSICDXBRIEFPROCEDURE_GEN_ALL_CORE_FT
PROCEDURES:  Pudendal nerve block 14-May-2025 11:10:54  Del Elda Vaughn  Trigger point injection of pelvic floor muscle 14-May-2025 11:11:56  Elda Mendieta

## 2025-05-14 NOTE — ASU DISCHARGE PLAN (ADULT/PEDIATRIC) - CARE PROVIDER_API CALL
Josh Rea  Urogyn and Reconst Pelvic Surg  [address] [place] [id number]  Phone: [phone]  Fax: [phone]  Follow Up Time:

## 2025-05-29 ENCOUNTER — APPOINTMENT (OUTPATIENT)
Dept: UROGYNECOLOGY | Facility: CLINIC | Age: 30
End: 2025-05-29

## 2025-06-27 PROBLEM — K29.70 GASTRITIS, UNSPECIFIED, WITHOUT BLEEDING: Chronic | Status: ACTIVE | Noted: 2025-05-03

## 2025-06-30 ENCOUNTER — APPOINTMENT (OUTPATIENT)
Dept: UROGYNECOLOGY | Facility: CLINIC | Age: 30
End: 2025-06-30

## (undated) DEVICE — SYR LUER LOK 20CC

## (undated) DEVICE — LAP PAD W RING 18 X 18"

## (undated) DEVICE — PREP CHLORAPREP HI-LITE ORANGE 26ML

## (undated) DEVICE — DRAPE LIGHT HANDLE COVER (GREEN)

## (undated) DEVICE — LUBRICATING JELLY ONESHOT 1.25OZ

## (undated) DEVICE — URETERAL CATH RED RUBBER 16FR (ORANGE)

## (undated) DEVICE — SOL IRR POUR NS 0.9% 1000ML

## (undated) DEVICE — WARMING BLANKET UPPER ADULT

## (undated) DEVICE — NDL SPINAL 18G X 3.5" (PINK)

## (undated) DEVICE — PACK LITHOTOMY

## (undated) DEVICE — NDL COUNTER FOAM AND MAGNET 40-70

## (undated) DEVICE — SYR CONTROL LUER LOK 10CC

## (undated) DEVICE — NDL SPINAL 20G X 3.5" (YELLOW)

## (undated) DEVICE — GOWN XXL